# Patient Record
Sex: MALE | Race: WHITE | NOT HISPANIC OR LATINO | Employment: FULL TIME | URBAN - METROPOLITAN AREA
[De-identification: names, ages, dates, MRNs, and addresses within clinical notes are randomized per-mention and may not be internally consistent; named-entity substitution may affect disease eponyms.]

---

## 2021-02-03 ENCOUNTER — HISTORICAL (OUTPATIENT)
Dept: ADMINISTRATIVE | Facility: HOSPITAL | Age: 51
End: 2021-02-03

## 2021-02-22 LAB
BUN SERPL-MCNC: 16.5 MG/DL (ref 8.4–25.7)
CALCIUM SERPL-MCNC: 10.1 MG/DL (ref 8.4–10.2)
CHLORIDE SERPL-SCNC: 104 MMOL/L (ref 98–107)
CO2 SERPL-SCNC: 28 MMOL/L (ref 22–29)
CREAT SERPL-MCNC: 1.64 MG/DL (ref 0.72–1.25)
CREAT/UREA NIT SERPL: 10
GLUCOSE SERPL-MCNC: 112 MG/DL (ref 74–100)
POTASSIUM SERPL-SCNC: 5 MMOL/L (ref 3.5–5.1)
SARS-COV-2 AG RESP QL IA.RAPID: NEGATIVE
SODIUM SERPL-SCNC: 139 MMOL/L (ref 136–145)

## 2021-02-25 ENCOUNTER — HISTORICAL (OUTPATIENT)
Dept: SURGERY | Facility: HOSPITAL | Age: 51
End: 2021-02-25

## 2021-05-11 ENCOUNTER — HISTORICAL (OUTPATIENT)
Dept: ADMINISTRATIVE | Facility: HOSPITAL | Age: 51
End: 2021-05-11

## 2021-05-14 LAB — SARS-COV-2 AG RESP QL IA.RAPID: NEGATIVE

## 2021-05-17 ENCOUNTER — HISTORICAL (OUTPATIENT)
Dept: SURGERY | Facility: HOSPITAL | Age: 51
End: 2021-05-17

## 2021-10-21 ENCOUNTER — HISTORICAL (OUTPATIENT)
Dept: ADMINISTRATIVE | Facility: HOSPITAL | Age: 51
End: 2021-10-21

## 2021-10-21 LAB
ABS NEUT (OLG): 5.81 X10(3)/MCL (ref 2.1–9.2)
ALBUMIN SERPL-MCNC: 4.3 GM/DL (ref 3.5–5)
ALBUMIN/GLOB SERPL: 1.3 RATIO (ref 1.1–2)
ALP SERPL-CCNC: 73 UNIT/L (ref 40–150)
ALT SERPL-CCNC: 43 UNIT/L (ref 0–55)
APPEARANCE, UA: CLEAR
AST SERPL-CCNC: 24 UNIT/L (ref 5–34)
BASOPHILS # BLD AUTO: 0.03 X10(3)/MCL (ref 0–0.2)
BASOPHILS NFR BLD AUTO: 0.3 % (ref 0–0.9)
BILIRUB SERPL-MCNC: 0.5 MG/DL (ref 0.2–1.2)
BILIRUB UR QL STRIP: NEGATIVE
BILIRUBIN DIRECT+TOT PNL SERPL-MCNC: 0.2 MG/DL (ref 0–0.5)
BILIRUBIN DIRECT+TOT PNL SERPL-MCNC: 0.3 MG/DL (ref 0–0.8)
BUN SERPL-MCNC: 17.3 MG/DL (ref 8.4–25.7)
CALCIUM SERPL-MCNC: 10.5 MG/DL (ref 8.4–10.2)
CHLORIDE SERPL-SCNC: 105 MMOL/L (ref 98–107)
CO2 SERPL-SCNC: 29 MMOL/L (ref 22–29)
COLOR UR: NORMAL
CREAT SERPL-MCNC: 1.55 MG/DL (ref 0.72–1.25)
EOSINOPHIL # BLD AUTO: 0.14 X10(3)/MCL (ref 0–0.9)
EOSINOPHIL NFR BLD AUTO: 1.6 % (ref 0–6.5)
ERYTHROCYTE [DISTWIDTH] IN BLOOD BY AUTOMATED COUNT: 12.3 % (ref 11.5–17)
EST. AVERAGE GLUCOSE BLD GHB EST-MCNC: 102.5 MG/DL
GLOBULIN SER-MCNC: 3.4 GM/DL (ref 2.4–3.5)
GLUCOSE (UA): NEGATIVE
GLUCOSE SERPL-MCNC: 100 MG/DL (ref 74–100)
HBA1C MFR BLD: 5.2 %
HCT VFR BLD AUTO: 45.4 % (ref 42–52)
HGB BLD-MCNC: 14.6 GM/DL (ref 14–18)
HGB UR QL STRIP: NEGATIVE
IMM GRANULOCYTES # BLD AUTO: 0.04 10*3/UL (ref 0–0.02)
IMM GRANULOCYTES NFR BLD AUTO: 0.4 % (ref 0–0.43)
KETONES UR QL STRIP: NEGATIVE
LEUKOCYTE ESTERASE UR QL STRIP: NEGATIVE
LYMPHOCYTES # BLD AUTO: 2.17 X10(3)/MCL (ref 0.6–4.6)
LYMPHOCYTES NFR BLD AUTO: 24.4 % (ref 16.2–38.3)
MCH RBC QN AUTO: 28.6 PG (ref 27–31)
MCHC RBC AUTO-ENTMCNC: 32.2 GM/DL (ref 33–36)
MCV RBC AUTO: 89 FL (ref 80–94)
MONOCYTES # BLD AUTO: 0.7 X10(3)/MCL (ref 0.1–1.3)
MONOCYTES NFR BLD AUTO: 7.9 % (ref 4.7–11.3)
MRSA SCREEN BY PCR: NEGATIVE
NEUTROPHILS # BLD AUTO: 5.81 X10(3)/MCL (ref 2.1–9.2)
NEUTROPHILS NFR BLD AUTO: 65.4 % (ref 49.1–73.4)
NITRITE UR QL STRIP: NEGATIVE
NRBC BLD AUTO-RTO: 0 % (ref 0–0.2)
PH UR STRIP: 6 [PH] (ref 5–7)
PLATELET # BLD AUTO: 225 X10(3)/MCL (ref 130–400)
PMV BLD AUTO: 10.1 FL (ref 7.4–10.4)
POTASSIUM SERPL-SCNC: 4.6 MMOL/L (ref 3.5–5.1)
PROT SERPL-MCNC: 7.7 GM/DL (ref 6.4–8.3)
PROT UR QL STRIP: NEGATIVE
RBC # BLD AUTO: 5.1 X10(6)/MCL (ref 4.7–6.1)
SODIUM SERPL-SCNC: 141 MMOL/L (ref 136–145)
SP GR UR STRIP: 1.01 (ref 1–1.03)
UROBILINOGEN UR STRIP-ACNC: NEGATIVE
WBC # SPEC AUTO: 8.9 X10(3)/MCL (ref 4.5–11.5)

## 2021-11-05 ENCOUNTER — HISTORICAL (OUTPATIENT)
Dept: LAB | Facility: HOSPITAL | Age: 51
End: 2021-11-05

## 2021-11-05 LAB — SARS-COV-2 AG RESP QL IA.RAPID: NEGATIVE

## 2021-11-23 ENCOUNTER — HISTORICAL (OUTPATIENT)
Dept: ADMINISTRATIVE | Facility: HOSPITAL | Age: 51
End: 2021-11-23

## 2021-12-21 ENCOUNTER — HISTORICAL (OUTPATIENT)
Dept: ADMINISTRATIVE | Facility: HOSPITAL | Age: 51
End: 2021-12-21

## 2022-04-12 ENCOUNTER — HISTORICAL (OUTPATIENT)
Dept: ADMINISTRATIVE | Facility: HOSPITAL | Age: 52
End: 2022-04-12

## 2022-04-30 VITALS
DIASTOLIC BLOOD PRESSURE: 83 MMHG | SYSTOLIC BLOOD PRESSURE: 138 MMHG | HEIGHT: 76 IN | WEIGHT: 306.63 LBS | BODY MASS INDEX: 37.34 KG/M2

## 2022-04-30 NOTE — OP NOTE
Patient:   Lejeune, Scott             MRN: [id number]            FIN: [id number]               Age:   50 years     Sex:  Male     :  1970   Associated Diagnoses:   None   Author:   Nigel Elizondo Jr, MD      SURGEON: Nigel Elizondo MD   ASSISTANT: ALFA Hansen.  Ms. Earl was essential manipulating the leg while performed arthroscopy, and closure    PREOPERATIVE DIAGNOSIS:   Right knee lateral meniscus tear, trochlea chondromalacia    POSTOPERATIVE DIAGNOSIS:   Right knee lateral meniscus tear, trochlea chondromalacia,     PROCEDURE PERFORMED:  1.  Right knee arthroscopic partial lateral meniscectomy  2.  Right knee arthroscopic shaving chondroplasty of trochlea      ESTIMATED BLOOD LOSS: 10cc.    COMPLICATIONS: None.    TOURNIQUET TIME: About twenty minutes.    ANTIBIOTICS: Ancef     INDICATIONS FOR PROCEDURE: Gordy is a [age] y.o. male who has had ongoing right knee pain. The patient has had to limit activity due to intermittent pain & occasional mechanical symptoms. An MRI was performed that showed a meniscus tear that I felt would be amenable to arthroscopic debridement. The patient decided to opt for surgery after failing conservative management.    OPERATIVE REPORT: Gordy was initially seen in the preoperative holding area where history and physical were reviewed without change. The operative leg was marked, consents were reviewed, and any questions were answered for the patient and family. The patient was then taken back to the operating room, placed supine on the operating table with all bony prominences padded. Then a nonsterile tourniquet was placed around the upper thigh. The patient was induced under general anesthesia. The operative lower extremity was prepped and draped in standard sterile fashion. A timeout led by the surgeon was performed, and preoperative antibiotics were given. The limp was exsanguinated with gravity. The tourniquet was raised to 100 mmHg over the systolic blood  pressure.    The knee was then flexed and the inferolateral portal was created with an #11 blade scalpel.    The camera was introduced, using the blunt trocar, into the suprapatellar pouch. The undersurface of the patella was found to have scattered grade 2 chondral wear and the trochlear groove was found to have scattered grade 3 with an unstable flap chondral wear . The camera was then taken down into the lateral gutter, where no loose bodies and no plica were found. The camera was then brought into the medial gutter, where no loose bodies and no plica were seen. The camera was then brought into the medial compartment.    An inferomedial portal was then localized with a spinal needle, and created using an #11 blade. The medial meniscus was probed and found to have a no tears.  The medial femoral condyle was found to have focal grade 2/3. The medial tibial plateau demonstrated no wear.    The camera was then turned to the notch, where the ACL was found to be intact.    Then the leg was brought into figure-of-four position. The camera was brought into the lateral compartment. The lateral meniscus was probed and found to have complex tear involving the posterior horn. A combination of baskets and zachery were used to debride the meniscal flap down to a stable margin of approximately eighty percent remaining and then shaver was used to smooth the edges. The lateral femoral condyle was found to have no chondral wear. The lateral tibial plateau had no chondral wear.    The camera was brought up into the patellofemoral joint once more, with the knee in extension and shaving chondroplasty was performed on the trochlea until smooth and stable cartilage margins were present.    The knee was once more examined for loose bodies, of which none were found. The knee was then evacuated of all fluids. The portals were closed with 3-0 monocyrl interrupted sutures and steristrips. 10mL of 0.25% Bupivicaine were infiltrated around  each portal. A sterile dressing was placed, and the tourniquet was deflated after about twenty minutes. The patient was awakened from anesthesia and taken to the postoperative care unit in stable condition.

## 2022-04-30 NOTE — OP NOTE
Patient:   Lejeune, Scott             MRN: [id number]            FIN: [id number]               Age:   51 years     Sex:  Male     :  1970   Associated Diagnoses:   Primary osteoarthritis of left hip   Author:   Juve Cobian MD      Operative Note   Operative Information   Procedures Performed: Left hip injection using fluoroscopy.     Preoperative Diagnosis: Primary osteoarthritis of left hip (ZPD72-EP M16.12).     Postoperative Diagnosis: Primary osteoarthritis of left hip (JDX75-SP M16.12).     Surgeon: Juve Cobian MD.     Anesthesia: concious sedation.     Description of Procedure/Findings/    Complications: Patient was involved who complains of ongoing left hip pain.  We discussed all treatment options.  Patient has a history of arthritis to the left hip.  Patient has tried and failed all measures.  The risk, benefits, alternatives to injection of left hip under anesthesia were discussed in detail including but limited to infection, bleeding, scarring, need for revision surgery, and resolution of pain.  Despite these risks patient elected to proceed with surgical mention.    Patient seen in preoperative holding.  The risk benefits alternatives again discussed.  All questions answered no guarantees made.  Patient was marked and consented.  Was taken the operating room.  placed under conscious sedation.  Using fluoroscopic guidance, the insertion site was localized.  It was injected with 3-4 cc 1% lidocaine.  Afterwards an 18-gauge spinal needle was then inserted using fluoroscopic guidance to the superior anterior aspect of the left hip/acetabulum.  2-3 cc of Isovue contrast was then injected.  We had an excellent arthrogram.  Afterwards 2 cc 1% lidocaine and 12 mg of betamethasone was injected into the right hip joint.  Needle was removed.  Band-Aid was placed over the injection site.  Patient arose from anesthesia without issue.  Was transferred to recovery room in stable condition. postop  he will be weightbearing as tolerated.  Patient will be discharged home.  .     Esimated blood loss: No blood loss.

## 2022-05-05 NOTE — HISTORICAL [facility] C[facility]ER
This is a historical note converted from Ana. Formatting and pictures may have been removed.  Please reference Ana for original formatting and attached multimedia. Chief Complaint  left total hip, global 11/8/2021-2/6/22. f/u patient stated his pain is better  History of Present Illness  51-year-old male presents here to the clinic today 2-week status post left total hip arthroplasty. ?Overall the patient is doing?excellent.? He is ambulating without any assistance today here in clinic. ?He does state that he has some pain especially after physical therapy. ?He is currently taking Percocet 5 mg and?muscle relaxers at night.? These have helped to alleviate his pain and discomfort.? He does need refills of both today here in clinic.? He has no complaints of his incision site?in regards to bleeding and or?drainage.? He has removed the?tape around the incision site and due to skin tearing.? He currently is in physical therapy 3 times a week and which is helping his mobility, strengthening, and stability.  Review of Systems  Denies fevers, chills, chest pain, shortness of breath. Comprehensive review of systems performed and otherwise negative except as noted in HPI  Physical Exam  Vitals & Measurements  T:?98.7? ?F (Oral)? HR:?77(Peripheral)? BP:?116/66?  HT:?192.00?cm? WT:?139.300?kg? BMI:?37.79?  General: awake and alert, no acute distress, healthy appearing  ?Head and Neck: Head atraumatic/normocephalic. Moist MM  ?CV: brisk cap refill  ?Lungs: non-labored breathing, w/o cough or SOB  ?Skin: no rashes present, warm to touch  ?Neuro: sensation grossly intact distally  ?  Left hip exam:  Left hip incision clean dry and intact. No erythema, drainage, or signs of infection  ?No swelling distally. No signs of DVT  Brisk cap refill right ?foot  ?Sensation intact distally to right foot  ?Positive FHL/EHL/gastrocsoleus/tib ant  Assessment/Plan  1.?Aftercare following joint replacement surgery?Z47.1  ?Patient presents  to clinic today 2 weeks status post left total hip arthroplasty. ?Overall the patient is doing extremely well. ?His hip is stable upon examination and x-rays today here in clinic. ?In regards to his incision site it is well-healed?and he is not to shower without vigorously scrubbing the incision site. ?We refilled his Percocet fives and Robaxin today here in clinic.? We did educate about coming down to [place] and will do this starting with his next?pharmacy request, due to the holiday?coming up.? He was educated to have some assistance around especially after therapy. ?A DME prescription was written?for a walking cane?to have at hand.? He does state understanding of this.? We will have the patient return to clinic within 1 month with x-rays?to reassess the status of his hip.  Ordered:  Clinic Follow up, *Est. 12/23/21 3:00:00 CST, Order for future visit, [facility]  Post-Op follow-up visit [id number] , Aftercare following joint replacement surgery, [facility], 11/23/21 9:33:00 CST  XR Hip Left 2 Views, Routine, *Est. 12/23/21 3:00:00 CST, None, Ambulatory, Rad Type, Order for future visit, Aftercare following joint replacement surgery, Not Scheduled, *Est. 12/23/21 3:00:00 CST  ?  Orders:  acetaminophen-oxyCODONE, 1 tab(s), Oral, q6hr, PRN PRN for pain, # 28 tab(s), 0 Refill(s), Pharmacy: [id number], 192, cm, Height/Length Dosing, 11/23/21 8:59:00 CST, 139.3, kg, Weight Dosing, 11/23/21 8:59:00 CST  methocarbamol, 1,500 mg = 2 tab(s), Oral, TID, X 7 day(s), # 42 tab(s), 0 Refill(s), Pharmacy: [id number], 192, cm, Height/Length Dosing, 11/23/21 8:59:00 CST, 139.3, kg, Weight Dosing, 11/23/21 8:59:00 CST  The above findings, diagnostics, and treatment plan were discussed with Dr. Juve Cobian who is in agreement with the plan of care.  Referrals  Ambulatory Referral, Specialty: DME, Refer To: Provider Not Specified, Aba Anderson  [facility], [address] [place] [address] [place] [address] Start: 11/23/21 9:33:00 CST, Aftercare following joint replacement surgery  Clinic Follow up, *Est. 12/23/21 3:00:00 CST, Order for future visit, Aftercare following joint replacement surgery, [facility]   Problem List/Past Medical History  Ongoing  High blood pressure  Left hip pain  Obesity  Osteoarthritis of left hip  S/P arthroscopy of knee  Vascular disease  Historical  No qualifying data  Procedure/Surgical History  SHELTON DURAN Total Hip Arthroplasty (Left) (11/08/2021)  Replacement of Left Hip Joint with Synthetic Substitute, Uncemented, Open Approach (11/08/2021)  Robotic Assisted Procedure of Lower Extremity, Open Approach (11/08/2021)  Arthrocentesis, aspiration and/or injection, major joint or bursa (eg, shoulder, hip, knee, subacromial bursa); without ultrasound guidance (05/17/2021)  Injection Hip (Left) (05/17/2021)  Introduction of Anti-inflammatory into Joints, Percutaneous Approach (05/17/2021)  Arthroscopy Knee W/Menisectomy (Right) (02/25/2021)  Arthroscopy, knee, surgical; abrasion arthroplasty (includes chondroplasty where necessary) or multiple drilling or microfracture (02/25/2021)  Arthroscopy, knee, surgical; with meniscectomy (medial OR lateral, including any meniscal shaving) including debridement/shaving of articular cartilage (chondroplasty), same or separate compartment(s), when performed (02/25/2021)  Excision of Right Knee Joint, Percutaneous Endoscopic Approach (02/25/2021)  Excision of Right Knee Joint, Percutaneous Endoscopic Approach (02/25/2021)  Rt hip stent (2016)  Kidney  Knee   Medications  acetaminophen-oxycodone 325 mg-5 mg oral tablet, 1 tab(s), Oral, q6hr  aspirin 81 mg oral Delayed Release (EC) tablet, 81 mg= 1 tab(s), Oral, BID  Bystolic 10 mg oral tablet, 10 mg= 1 tab(s), Oral, Daily  clopidogrel 75 mg oral tablet, 75 mg= 1 tab(s), Oral, Daily  febuxostat 80 mg oral tablet, 40 mg= 0.5 tab(s), Oral,  Daily  Glucosamine Chondroitin Advanced  lisinopril 40 mg oral tablet, 40 mg= 1 tab(s), Oral, Daily  NexIUM, 10, Oral, Daily  Percocet 5/325 oral tablet, 1 tab(s), Oral, q6hr, PRN  polyethylene glycol 3350 oral powder for reconstitution, 17 gm, Oral, Daily, PRN  Robaxin 750 mg oral tablet, 1500 mg= 2 tab(s), Oral, TID  Robaxin 750 mg oral tablet, 750 mg= 1 tab(s), Oral, q6hr, PRN  Tumeric/Curcumin, 500 mg, Oral, Daily  Allergies  codeine?(Vomiting, Nausea)  Social History  Abuse/Neglect  No, No, Yes, 11/23/2021  Alcohol  Current, 1-2 times per week, 05/11/2021  Employment/School  Employed, 11/01/2021  Home/Environment  Lives with Spouse., 11/01/2021  Nutrition/Health  Regular, 11/01/2021  Spiritual/Cultural  P[employer], 11/01/2021  Substance Use  Never, 05/11/2021  Tobacco  Never (less than 100 in lifetime), No, 11/23/2021  Family History  Family history is negative  Health Maintenance  Health Maintenance  ???Pending?(in the next year)  ??? ??Due?  ??? ? ? ?ADL Screening due??11/23/21??and every 1??year(s)  ??? ? ? ?Colorectal Screening due??11/23/21??Unknown Frequency  ??? ? ? ?Lipid Screening due??11/23/21??Unknown Frequency  ??? ? ? ?Tetanus Vaccine due??11/23/21??and every 10??year(s)  ??? ? ? ?Zoster Vaccine due??11/23/21??Unknown Frequency  ??? ??Due In Future?  ??? ? ? ?Obesity Screening not due until??01/01/22??and every 1??year(s)  ??? ? ? ?Alcohol Misuse Screening not due until??01/02/22??and every 1??year(s)  ??? ? ? ?Depression Screening not due until??06/29/22??and every 1??year(s)  ??? ? ? ?Hypertension Management-BMP not due until??11/10/22??and every 1??year(s)  ??? ? ? ?Aspirin Therapy for CVD Prevention not due until??11/10/22??and every 1??year(s)  ???Satisfied?(in the past 1 year)  ??? ??Satisfied?  ??? ? ? ?Alcohol Misuse Screening on??03/29/21.??Satisfied by Yasir Nelson  ??? ? ? ?Aspirin Therapy for CVD Prevention on??11/10/21.??Satisfied by Celina Tovar RN  ??? ? ? ?Blood Pressure Screening  on??11/23/21.??Satisfied by Gisele Chew  ??? ? ? ?Body Mass Index Check on??11/23/21.??Satisfied by Gisele Chew  ??? ? ? ?Depression Screening on??06/29/21.??Satisfied by Bravo Miranda  ??? ? ? ?Diabetes Screening on??11/10/21.??Satisfied by Cortney Hernandez  ??? ? ? ?Hypertension Management-Blood Pressure on??11/23/21.??Satisfied by Gisele Chew  ??? ? ? ?Hypertension Management-BMP on??11/10/21.??Satisfied by Cortney Hernandez  ??? ? ? ?Influenza Vaccine on??11/08/21.??Satisfied by Linda FLORES, Kerline SLATER  ??? ? ? ?Obesity Screening on??11/23/21.??Satisfied by Gisele Chew  ?  Diagnostic Results  AP &?lateral?left hip?reviewed. ?Patients implants well fixed with no signs of loosening or subsidence noted.

## 2022-05-05 NOTE — HISTORICAL [facility] CERNER
This is a historical note converted from Ana. Formatting and pictures may have been removed.  Please reference Ana for original formatting and attached multimedia. Chief Complaint  Patient presents for left hip arthritis. Referred by Dr Elizondo. States pain has been progressing for about a year. Having sharp pain when he walks. Denies having had any injections to help with pain in the past. Taking ibuprofen/aleve to help with pain.  History of Present Illness  51-year-old male presents to the clinic today?for left?hip pain.? He is a patient of Dr. Elizondo and was referred?to us. ?Left hip pain is more into the groin and radiates into the buttock area.? His pain began about a year ago and has progressively gotten worse.??The pain is worse with?walking, putting onto his socks,?and going up and down the stairs. ?He has tried exercises,?chiropractor,?and over-the-counter anti-inflammatories.? These nonsurgical modalities have?helped with the pain very minimally.? He would like to discuss options?today to help with the pain.  Review of Systems  Denies fevers, chills, chest pain, shortness of breath. Comprehensive review of systems performed and otherwise negative except as noted in HPI  Physical Exam  Vitals & Measurements  T:?97.6? ?F (Oral)? HR:?63(Peripheral)? BP:?143/81?  HT:?192.00?cm? WT:?137.890?kg? BMI:?37.41?  General: awake and alert, no acute distress, healthy appearing  ?Head and Neck: Head atraumatic/normocephalic. Moist MM  ?CV: brisk cap refill  ?Lungs: non-labored breathing, w/o cough or SOB  ?Skin: no rashes present, warm to touch  ?Neuro: sensation grossly intact distally  ?  Left hip:  Skin warm, dry, and intact  Brisk capillary refill distally  Pulse +2  Sensation intact distally  Range of motion with internal and external rotation?with groin pain  Negative straight leg raise  Positive [facility]  Assessment/Plan  1.?Osteoarthritis of left hip?M16.12  ?Patient does have a history of osteoarthritis  to the left hip.? He has tried exercise,?chiropractor,?and over-the-counter anti-inflammatories which have?minimally helped with the pain.? We have discussed options?such as an injection and?total hip arthroplasty.? At this time he would like to undergo a steroid?hip injection?under?fluoroscopy.? He understands the risk and benefits of the procedure and would like to go forward the procedure.? We will pick a date for his procedure?today here in clinic.? After the injection he would like to proceed with a total hip arthroplasty?during the fall.   Problem List/Past Medical History  Ongoing  High blood pressure  Obesity  S/P arthroscopy of knee  Vascular disease  Historical  No qualifying data  Procedure/Surgical History  Arthroscopy Knee W/Menisectomy (Right) (02/25/2021)  Arthroscopy, knee, surgical; abrasion arthroplasty (includes chondroplasty where necessary) or multiple drilling or microfracture (02/25/2021)  Arthroscopy, knee, surgical; with meniscectomy (medial OR lateral, including any meniscal shaving) including debridement/shaving of articular cartilage (chondroplasty), same or separate compartment(s), when performed (02/25/2021)  Excision of Right Knee Joint, Percutaneous Endoscopic Approach (02/25/2021)  Excision of Right Knee Joint, Percutaneous Endoscopic Approach (02/25/2021)  Kidney  Knee  Stent   Medications  azithromycin 250 mg oral tablet, Oral, As Directed,? ?Not Taking, Completed Rx  Bystolic 10 mg oral tablet, 10 mg= 1 tab(s), Oral, Daily  clopidogrel 75 mg oral tablet, 75 mg= 1 tab(s), Oral, Daily  febuxostat 80 mg oral tablet, 80 mg= 1 tab(s), Oral, Daily  Glucosamine Chondroitin Advanced  lisinopril 40 mg oral tablet, 40 mg= 1 tab(s), Oral, Daily  Tumeric/Curcumin, 500 mg, Oral, Daily  Allergies  codeine?(Vomiting, Nausea)  Social History  Abuse/Neglect  No, No, Yes, 05/11/2021  Alcohol  Current, 1-2 times per week, 05/11/2021  Substance Use  Never, 05/11/2021  Tobacco  Never (less than 100 in  lifetime), No, 05/11/2021  Family History  Family history is negative  Health Maintenance  Health Maintenance  ???Pending?(in the next year)  ??? ??OverDue  ??? ? ? ?Influenza Vaccine due??10/01/20??and every 1??day(s)  ??? ??Due?  ??? ? ? ?ADL Screening due??05/11/21??and every 1??year(s)  ??? ? ? ?Aspirin Therapy for CVD Prevention due??05/11/21??and every 1??year(s)  ??? ? ? ?Colorectal Screening due??05/11/21??Unknown Frequency  ??? ? ? ?Lipid Screening due??05/11/21??Unknown Frequency  ??? ? ? ?Tetanus Vaccine due??05/11/21??and every 10??year(s)  ??? ? ? ?Zoster Vaccine due??05/11/21??Unknown Frequency  ??? ??Due In Future?  ??? ? ? ?Obesity Screening not due until??01/01/22??and every 1??year(s)  ??? ? ? ?Alcohol Misuse Screening not due until??01/02/22??and every 1??year(s)  ??? ? ? ?Hypertension Management-BMP not due until??02/22/22??and every 1??year(s)  ???Satisfied?(in the past 1 year)  ??? ??Satisfied?  ??? ? ? ?Alcohol Misuse Screening on??03/29/21.??Satisfied by Yasir Nelson  ??? ? ? ?Blood Pressure Screening on??05/11/21.??Satisfied by Aylin Santiago  ??? ? ? ?Body Mass Index Check on??05/11/21.??Satisfied by Aylin Santiago  ??? ? ? ?Depression Screening on??05/11/21.??Satisfied by Aylin Santiago  ??? ? ? ?Diabetes Screening on??02/22/21.??Satisfied by Jack Boo  ??? ? ? ?Hypertension Management-Blood Pressure on??05/11/21.??Satisfied by Aylin Santiago  ??? ? ? ?Influenza Vaccine on??03/29/21.??Satisfied by Yasir Nelson  ??? ? ? ?Obesity Screening on??05/11/21.??Satisfied by Aylin Santiago  ?  Diagnostic Results  AP and lateral?left hip reviewed.? Joint space narrowing?and osteophytes noted?throughout?joint.

## 2022-05-05 NOTE — HISTORICAL [facility]
This is a historical note converted from Ana. Formatting and pictures may have been removed.  Please reference Ana for original formatting and attached multimedia. Chief Complaint  PT PRESENTS FOR PRE OP FOR LEFT DANILO ON 11-8-21  History of Present Illness  51-year-old male presents here follow-up of left osteoarthritis knee patient has had seen pain in left hip. ?He tried Ventolin to measure an anti-inflammatory take medications but he became. ?Patient feels a he has reached a point of disability guards left hip proceed with surgical intervention.  Review of Systems  Denies fevers, chills, chest pain, shortness of breath. Comprehensive review of systems performed and otherwise negative except as noted in HPI.  Physical Exam  Vitals & Measurements  T:?98.1? ?F (Oral)? BP:?144/81?  HT:?192.00?cm? WT:?139.250?kg? BMI:?37.77?  General: No acute distress, alert and oriented, healthy appearing?  HEENT: Head is atraumatic, mucous membranes are moist?  Cardiovascular: Brisk capillary refill  Lungs: Breathing non-labored?  Skin: no rashes appreciated?  Neurologic: Sensation is grossly intact distally  ?  Left hip:  Patient walks antalgic gait.? He has pain with internal and external rotation. ?Positive Stinchfield. ?Brisk cap refill distally.  Assessment/Plan  1.?Osteoarthritis of left hip?M16.12  At this point the patient is tried and failed all conservative management with regards to their left hip. ?They have tried and failed nonoperative management including: Anti-inflammatories, activity modification, and assistive devices. All of these have failed to completely remove their pain. They have pain putting on shoes and socks, getting in and out of a car, as well as walking on level ground. Their hip pain is affecting activities of daily living They feel that theyve reached a point of disability with regards to their hip. X-rays reveal advanced, end-stage degenerative osteoarthritis as noted by subchondral sclerosis,  joint space narrowing, and periarticular osteophytes. The patient would like to proceed with surgical intervention and would be a good candidate for total hip replacement.  Total hip arthroplasty procedure, alternatives, risks, and benefits were discussed in detail. The risks including but not limited to: infection, need for revision surgery, pain, swelling, loosening, injury to surrounding neurovascular structures, stiffness, incomplete resolution of pain, limb length discrepancy, DVT, PE, and death were discussed in detail. Despite these risks, the patient would like to proceed with surgical intervention. All questions were answered, no guarantees made. Will plan for left DANILO on 11/8/2021.  Patient is currently on blood thinners. ?We will be monitored closely for postoperative bleeding. ?Plan to be in the hospital 48 to 72 hours.  Ordered:  XR Spine Lumbar 2 or 3 Views, Routine, 10/21/21 13:59:00 CDT, None, Ambulatory, Rad Type, Osteoarthritis of left hip, Not Scheduled, 10/21/21 13:59:00 CDT  ?   Problem List/Past Medical History  Ongoing  High blood pressure  Left hip pain  Obesity  Osteoarthritis of left hip  S/P arthroscopy of knee  Vascular disease  Historical  No qualifying data  Procedure/Surgical History  Arthrocentesis, aspiration and/or injection, major joint or bursa (eg, shoulder, hip, knee, subacromial bursa); without ultrasound guidance (05/17/2021)  Injection Hip (Left) (05/17/2021)  Introduction of Anti-inflammatory into Joints, Percutaneous Approach (05/17/2021)  Arthroscopy Knee W/Menisectomy (Right) (02/25/2021)  Arthroscopy, knee, surgical; abrasion arthroplasty (includes chondroplasty where necessary) or multiple drilling or microfracture (02/25/2021)  Arthroscopy, knee, surgical; with meniscectomy (medial OR lateral, including any meniscal shaving) including debridement/shaving of articular cartilage (chondroplasty), same or separate compartment(s), when performed (02/25/2021)  Excision of  Right Knee Joint, Percutaneous Endoscopic Approach (02/25/2021)  Excision of Right Knee Joint, Percutaneous Endoscopic Approach (02/25/2021)  Rt hip stent (2016)  Kidney  Knee   Medications  Bystolic 10 mg oral tablet, 10 mg= 1 tab(s), Oral, Daily  clopidogrel 75 mg oral tablet, 75 mg= 1 tab(s), Oral, Daily  febuxostat 80 mg oral tablet, 80 mg= 1 tab(s), Oral, Daily  Glucosamine Chondroitin Advanced  lisinopril 40 mg oral tablet, 40 mg= 1 tab(s), Oral, Daily  Tumeric/Curcumin, 500 mg, Oral, Daily  Allergies  codeine?(Vomiting, Nausea)  Social History  Abuse/Neglect  No, No, Yes, 10/07/2021  Alcohol  Current, 1-2 times per week, 05/11/2021  Substance Use  Never, 05/11/2021  Tobacco  Never (less than 100 in lifetime), No, 10/07/2021  Family History  Family history is negative  Health Maintenance  Health Maintenance  ???Pending?(in the next year)  ??? ??Due?  ??? ? ? ?ADL Screening due??10/21/21??and every 1??year(s)  ??? ? ? ?Aspirin Therapy for CVD Prevention due??10/21/21??and every 1??year(s)  ??? ? ? ?Colorectal Screening due??10/21/21??Unknown Frequency  ??? ? ? ?Lipid Screening due??10/21/21??Unknown Frequency  ??? ? ? ?Tetanus Vaccine due??10/21/21??and every 10??year(s)  ??? ? ? ?Zoster Vaccine due??10/21/21??Unknown Frequency  ??? ??Due In Future?  ??? ? ? ?Obesity Screening not due until??01/01/22??and every 1??year(s)  ??? ? ? ?Alcohol Misuse Screening not due until??01/02/22??and every 1??year(s)  ??? ? ? ?Hypertension Management-BMP not due until??02/22/22??and every 1??year(s)  ??? ? ? ?Depression Screening not due until??06/29/22??and every 1??year(s)  ???Satisfied?(in the past 1 year)  ??? ??Satisfied?  ??? ? ? ?Alcohol Misuse Screening on??03/29/21.??Satisfied by Yasir Nelson  ??? ? ? ?Blood Pressure Screening on??10/21/21.??Satisfied by GODWIN Turk Tinea  ??? ? ? ?Body Mass Index Check on??10/21/21.??Satisfied by GODWIN Turk Tinea  ??? ? ? ?Depression Screening on??06/29/21.??Satisfied by  Bravo Miranda  ??? ? ? ?Diabetes Screening on??02/22/21.??Satisfied by Jack Boo  ??? ? ? ?Hypertension Management-Blood Pressure on??10/21/21.??Satisfied by GODWIN Turk Tinea  ??? ? ? ?Influenza Vaccine on??10/21/21.??Satisfied by GODWIN Turk Tinea  ??? ? ? ?Obesity Screening on??10/21/21.??Satisfied by GODWIN Turk Tinea  ?  Diagnostic Results  Lateral sitting and standing lumbar spine x-rays reviewed for preoperative evaluation only.

## 2022-05-05 NOTE — HISTORICAL [facility]
This is a historical note converted from Ana. Formatting and pictures may have been removed.  Please reference Ana for original formatting and attached multimedia. Chief Complaint  New patient here with right knee pain after climbing on a bush hog and twisted knee. [place] pop and had immediate pain and some swelling. Xrays today.  History of Present Illness  He is a pleasant 49yo who presents with right knee pain since January 2021 after climbing a bush hog and twisting knee. Injury was 1 week ago. He felt a pop and had swelling. He has since had continued intermittent swelling. Pain is described as sharp, shooting. It is located in the anterior knee and radiates to the posterior. It is worse with bending, walking down stairs, and getting up. It is better with rest. He has tried Ibuprofen with some relief.  Review of Systems  Comprehensive review of system was performed with no exceptions other than noted in the history of present illness  Physical Exam  Vitals & Measurements  HR:?61(Peripheral)? BP:?143/75?  HT:?192.00?cm? WT:?136.500?kg? BMI:?37.03?  Gen: WN, WD, NAD  Card/Res: NL breathing, +distal pulses  Abdomen: ND  Standing exam  stance: normal alignment, no significant leg-length discrepancy  gait:?Antalgic limp  ?   Knee examination  - General comments: unremarkable appearance  ?   - Tenderness: MJL, Posterior knee  ?   Knee???????????????RIGHT??????????LEFT  Skin:??????????????? Intact ??????????Intact  ROM:??????????????? 0-120??????????0-130  Effusion:??????????? +???????????? Neg  MJL TTP:??????????? +??????????????? Neg  LJL TTP:????????????Neg?????????? ?? Neg  Aline:?????????? +???????????? Neg  Pat crep:??????????? Neg???????????? Neg  Patella TTPs:????? Neg?????????????Neg  Patella grind:????? Neg??????????? ?Neg  Lachman:???????????Neg???????????? Neg  Pivot shift: ?????? ??Neg??????????? Neg  Valgus stress:???? ?Neg??????? ???Neg  Varus stress:?????? Neg????????????Neg  Posterior drawer:  Neg????????? ??Neg  ?   N-V????????????????intact????????????? intact  Hip:?????????????????nml?????????????? nml  ?   Lower extremity edema:Negative??  Assessment/Plan  1.?Tear of medial meniscus of right knee?S83.241A  ?Concern for medial meniscus tear. ?Will get MRI to evaluate  Ordered:  Clinic Follow up, *Est. 02/08/21 8:30:00 CST, Order for future visit, Tear of medial meniscus of right knee, [facility]  MRI Ext Lower Joint Right W/O Contrast, Routine, 02/03/21 9:05:00 CST, Other (please specify), Knee trauma, internal derangement suspected, neg xray or avulsion fracture, None, Ambulatory, Rad Type, Order for future visit, Tear of medial meniscus of right knee, Schedule this test, Outside F...  Office/Outpatient Visit Level 3 New [id number] PC, [facility] medial meniscus of right knee[facility], 02/03/21 9:03:00 CST  ?  Orders:  XR Knee Right 4 or More Views, Routine, 02/03/21 8:33:00 CST, None, Ambulatory, Rad Type, Right knee pain, Not Scheduled, 02/03/21 8:33:00 CST   Problem List/Past Medical History  Ongoing  High blood pressure  Obesity  Vascular disease  Historical  No qualifying data  Procedure/Surgical History  Kidney  Knee  Stent   Medications  Bystolic 10 mg oral tablet, 10 mg= 1 tab(s), Oral, Daily  clopidogrel 75 mg oral tablet, 75 mg= 1 tab(s), Oral, Daily  febuxostat 80 mg oral tablet, 80 mg= 1 tab(s), Oral, Daily  Glucosamine Chondroitin Advanced  lisinopril 40 mg oral tablet, 40 mg= 1 tab(s), Oral, Daily  Allergies  codeine?(Vomiting, Nausea)  Social History  Abuse/Neglect  No, No, Yes, 02/03/2021  Alcohol  Current, 1-2 times per month, 02/03/2021  Tobacco  Never (less than 100 in lifetime), No, 02/03/2021  Health Maintenance  Health Maintenance  ???Pending?(in the next year)  ??? ??Due?  ??? ? ? ?Alcohol Misuse Screening due??01/02/21??and every 1??year(s)  ??? ? ? ?ADL Screening due??02/03/21??and every 1??year(s)  ??? ? ? ?Aspirin Therapy for CVD Prevention due??02/03/21??and every  1??year(s)  ??? ? ? ?Tetanus Vaccine due??02/03/21??and every 10??year(s)  ??? ??Due In Future?  ??? ? ? ?Obesity Screening not due until??01/01/22??and every 1??year(s)  ???Satisfied?(in the past 1 year)  ??? ??Satisfied?  ??? ? ? ?Blood Pressure Screening on??02/03/21.??Satisfied by Anuja Mathur.  ??? ? ? ?Body Mass Index Check on??02/03/21.??Satisfied by Anuja Mathur.  ??? ? ? ?Obesity Screening on??02/03/21.??Satisfied by Anuja Mathur  ?  Diagnostic Results  Knee radiographs showed no?arthritis

## 2022-05-21 NOTE — HISTORICAL [facility]
This is a historical note converted from Ana. Formatting and pictures may have been removed.  Please reference Ana for original formatting and attached multimedia. Chief Complaint  Follow up left total hip 11/8/21-2/6/22. States slight pain at times, reports sometimes more stiff than others. Overall doing well.  History of Present Illness  51-year-old male 6 weeks out from?left hip arthritis. ?Overall doing fairly well. ?Some stiffness but otherwise happy with his recovery and pain relief.  Review of Systems  Denies fevers, chills, chest pain, shortness of breath. Comprehensive review of systems performed and otherwise negative except as noted in HPI.  Physical Exam  Vitals & Measurements  T:?98.5? ?F (Oral)? HR:?59(Peripheral)? BP:?138/83?  HT:?192.00?cm? WT:?139.070?kg? BMI:?37.73?  Hip exam:  Left?hip incision clean dry and intact. No erythema, drainage, or signs of infection  No swelling distally. No signs of DVT  No pain with logroll  Sensation intact distally to right foot  Positive FHL/EHL/gastrocsoleus/tib ant brisk capillary refill right foot  ?  Assessment/Plan  1.?Aftercare following joint replacement surgery?Z47.1  ?Overall patient doing very well. ?He like to return to work. ?We will clear him for this. ?We will see him back in 2 months. ?No new x-rays needed at that time.  Referrals  Clinic Follow up, *Est. 02/21/22 3:00:00 CST, Order for future visit, Aftercare following joint replacement surgery, [facility]   Problem List/Past Medical History  Ongoing  High blood pressure  Left hip pain  Obesity  Osteoarthritis of left hip  S/P arthroscopy of knee  Vascular disease  Historical  No qualifying data  Procedure/Surgical History  SHELTON DURAN Total Hip Arthroplasty (Left) (11/08/2021)  Replacement of Left Hip Joint with Synthetic Substitute, Uncemented, Open Approach (11/08/2021)  Robotic Assisted Procedure of Lower Extremity, Open Approach (11/08/2021)  Arthrocentesis, aspiration and/or injection,  major joint or bursa (eg, shoulder, hip, knee, subacromial bursa); without ultrasound guidance (05/17/2021)  Injection Hip (Left) (05/17/2021)  Introduction of Anti-inflammatory into Joints, Percutaneous Approach (05/17/2021)  Arthroscopy Knee W/Menisectomy (Right) (02/25/2021)  Arthroscopy, knee, surgical; abrasion arthroplasty (includes chondroplasty where necessary) or multiple drilling or microfracture (02/25/2021)  Arthroscopy, knee, surgical; with meniscectomy (medial OR lateral, including any meniscal shaving) including debridement/shaving of articular cartilage (chondroplasty), same or separate compartment(s), when performed (02/25/2021)  Excision of Right Knee Joint, Percutaneous Endoscopic Approach (02/25/2021)  Excision of Right Knee Joint, Percutaneous Endoscopic Approach (02/25/2021)  Rt hip stent (2016)  Kidney  Knee   Medications  acetaminophen-oxycodone 325 mg-5 mg oral tablet, 1 tab(s), Oral, q6hr,? ?Not taking  aspirin 81 mg oral Delayed Release (EC) tablet, 81 mg= 1 tab(s), Oral, BID  Bystolic 10 mg oral tablet, 10 mg= 1 tab(s), Oral, Daily  clopidogrel 75 mg oral tablet, 75 mg= 1 tab(s), Oral, Daily  febuxostat 80 mg oral tablet, 40 mg= 0.5 tab(s), Oral, Daily  Glucosamine Chondroitin Advanced  lisinopril 40 mg oral tablet, 40 mg= 1 tab(s), Oral, Daily  NexIUM, 10, Oral, Daily  Percocet 5/325 oral tablet, 1 tab(s), Oral, q6hr, PRN,? ?Not taking  Tumeric/Curcumin, 500 mg, Oral, Daily  Zithromax TRI-BRIDGETTE 500 mg oral tablet, 500 mg= 1 tab(s), Oral, Daily  Allergies  codeine?(Vomiting, Nausea)  Social History  Abuse/Neglect  No, No, Yes, 12/21/2021  Alcohol  Current, 1-2 times per week, 12/21/2021  Employment/School  Employed, 11/01/2021  Home/Environment  Lives with Spouse., 11/01/2021  Nutrition/Health  Regular, 11/01/2021  Spiritual/Cultural  Restorationist, 11/01/2021  Substance Use  Never, 05/11/2021  Tobacco  Never (less than 100 in lifetime), No, 12/21/2021  Family History  Family history is  negative  Health Maintenance  Health Maintenance  ???Pending?(in the next year)  ??? ??OverDue  ??? ? ? ?Influenza Vaccine due??10/01/21??and every 1??day(s)  ??? ??Due?  ??? ? ? ?Obesity Screening due??01/01/22??and every 1??year(s)  ??? ? ? ?Alcohol Misuse Screening due??01/02/22??and every 1??year(s)  ??? ? ? ?ADL Screening due??02/02/22??and every 1??year(s)  ??? ? ? ?Colorectal Screening due??02/02/22??Unknown Frequency  ??? ? ? ?Lipid Screening due??02/02/22??Unknown Frequency  ??? ? ? ?Tetanus Vaccine due??02/02/22??and every 10??year(s)  ??? ? ? ?Zoster Vaccine due??02/02/22??Unknown Frequency  ??? ??Due In Future?  ??? ? ? ?Hypertension Management-BMP not due until??11/10/22??and every 1??year(s)  ??? ? ? ?Aspirin Therapy for CVD Prevention not due until??11/10/22??and every 1??year(s)  ??? ? ? ?Blood Pressure Screening not due until??12/21/22??and every 1??year(s)  ??? ? ? ?Body Mass Index Check not due until??12/21/22??and every 1??year(s)  ??? ? ? ?Depression Screening not due until??12/21/22??and every 1??year(s)  ??? ? ? ?Hypertension Management-Blood Pressure not due until??12/21/22??and every 1??year(s)  ???Satisfied?(in the past 1 year)  ??? ??Satisfied?  ??? ? ? ?Alcohol Misuse Screening on??03/29/21.??Satisfied by Yasir Nelson  ??? ? ? ?Aspirin Therapy for CVD Prevention on??11/10/21.??Satisfied by Celina Tovar RN  ??? ? ? ?Blood Pressure Screening on??12/21/21.??Satisfied by Aylin Santiago  ??? ? ? ?Body Mass Index Check on??12/21/21.??Satisfied by Aylin Santiago  ??? ? ? ?Depression Screening on??12/21/21.??Satisfied by Aylin Santiago  ??? ? ? ?Diabetes Screening on??11/10/21.??Satisfied by Cortney Hernandez  ??? ? ? ?Hypertension Management-Blood Pressure on??12/21/21.??Satisfied by Aylin Santiago  ??? ? ? ?Hypertension Management-BMP on??11/10/21.??Satisfied by Cortney Hernandez  ??? ? ? ?Influenza Vaccine on??11/08/21.??Satisfied by Linda FLORES, Kerline LANCE.  ??? ? ? ?Obesity  Screening on??12/21/21.??Satisfied by Aylin Santiago  ?  Diagnostic Results  AP lateral left hip reviewed. ?Patients implants appear well fixed. ?No signs of loosening or subsidence noted.

## 2022-11-29 ENCOUNTER — HOSPITAL ENCOUNTER (OUTPATIENT)
Dept: RADIOLOGY | Facility: CLINIC | Age: 52
Discharge: HOME | End: 2022-11-29
Attending: ORTHOPAEDIC SURGERY
Payer: COMMERCIAL

## 2022-11-29 ENCOUNTER — OFFICE VISIT (OUTPATIENT)
Dept: ORTHOPEDICS | Facility: CLINIC | Age: 52
End: 2022-11-29
Payer: COMMERCIAL

## 2022-11-29 VITALS
SYSTOLIC BLOOD PRESSURE: 144 MMHG | HEIGHT: 75 IN | DIASTOLIC BLOOD PRESSURE: 88 MMHG | BODY MASS INDEX: 38.05 KG/M2 | HEART RATE: 60 BPM | WEIGHT: 306 LBS

## 2022-11-29 DIAGNOSIS — Z96.642 HISTORY OF LEFT HIP REPLACEMENT: Primary | ICD-10-CM

## 2022-11-29 DIAGNOSIS — Z96.642 HISTORY OF LEFT HIP REPLACEMENT: ICD-10-CM

## 2022-11-29 PROCEDURE — 3077F SYST BP >= 140 MM HG: CPT | Mod: CPTII,,, | Performed by: ORTHOPAEDIC SURGERY

## 2022-11-29 PROCEDURE — 1159F MED LIST DOCD IN RCRD: CPT | Mod: CPTII,,, | Performed by: ORTHOPAEDIC SURGERY

## 2022-11-29 PROCEDURE — 1159F PR MEDICATION LIST DOCUMENTED IN MEDICAL RECORD: ICD-10-PCS | Mod: CPTII,,, | Performed by: ORTHOPAEDIC SURGERY

## 2022-11-29 PROCEDURE — 99213 PR OFFICE/OUTPT VISIT, EST, LEVL III, 20-29 MIN: ICD-10-PCS | Mod: ,,, | Performed by: ORTHOPAEDIC SURGERY

## 2022-11-29 PROCEDURE — 3008F PR BODY MASS INDEX (BMI) DOCUMENTED: ICD-10-PCS | Mod: CPTII,,, | Performed by: ORTHOPAEDIC SURGERY

## 2022-11-29 PROCEDURE — 73502 X-RAY EXAM HIP UNI 2-3 VIEWS: CPT | Mod: LT,,, | Performed by: ORTHOPAEDIC SURGERY

## 2022-11-29 PROCEDURE — 3079F DIAST BP 80-89 MM HG: CPT | Mod: CPTII,,, | Performed by: ORTHOPAEDIC SURGERY

## 2022-11-29 PROCEDURE — 4010F PR ACE/ARB THEARPY RXD/TAKEN: ICD-10-PCS | Mod: CPTII,,, | Performed by: ORTHOPAEDIC SURGERY

## 2022-11-29 PROCEDURE — 3077F PR MOST RECENT SYSTOLIC BLOOD PRESSURE >= 140 MM HG: ICD-10-PCS | Mod: CPTII,,, | Performed by: ORTHOPAEDIC SURGERY

## 2022-11-29 PROCEDURE — 99213 OFFICE O/P EST LOW 20 MIN: CPT | Mod: ,,, | Performed by: ORTHOPAEDIC SURGERY

## 2022-11-29 PROCEDURE — 3008F BODY MASS INDEX DOCD: CPT | Mod: CPTII,,, | Performed by: ORTHOPAEDIC SURGERY

## 2022-11-29 PROCEDURE — 3079F PR MOST RECENT DIASTOLIC BLOOD PRESSURE 80-89 MM HG: ICD-10-PCS | Mod: CPTII,,, | Performed by: ORTHOPAEDIC SURGERY

## 2022-11-29 PROCEDURE — 73502 XR HIP WITH PELVIS WHEN PERFORMED, 2 OR 3 VIEWS LEFT: ICD-10-PCS | Mod: LT,,, | Performed by: ORTHOPAEDIC SURGERY

## 2022-11-29 PROCEDURE — 4010F ACE/ARB THERAPY RXD/TAKEN: CPT | Mod: CPTII,,, | Performed by: ORTHOPAEDIC SURGERY

## 2022-11-29 RX ORDER — LISINOPRIL 40 MG/1
40 TABLET ORAL DAILY
COMMUNITY

## 2022-11-29 RX ORDER — CLOPIDOGREL BISULFATE 75 MG/1
75 TABLET ORAL DAILY
COMMUNITY

## 2022-11-29 RX ORDER — NEBIVOLOL 10 MG/1
10 TABLET ORAL DAILY
COMMUNITY

## 2022-11-29 NOTE — PROGRESS NOTES
Past Medical History:   Diagnosis Date    AVN (avascular necrosis of bone)     HTN (hypertension)        Past Surgical History:   Procedure Laterality Date    KIDNEY SURGERY      medial menisectomy Right 02/25/2021    right hip stent      TOTAL HIP ARTHROPLASTY Left 11/08/2021       Current Outpatient Medications   Medication Sig    clopidogreL (PLAVIX) 75 mg tablet Take 75 mg by mouth once daily.    lisinopriL (PRINIVIL,ZESTRIL) 40 MG tablet Take 40 mg by mouth once daily.    nebivoloL (BYSTOLIC) 10 MG Tab Take 10 mg by mouth once daily.     No current facility-administered medications for this visit.       Review of patient's allergies indicates:  No Known Allergies    History reviewed. No pertinent family history.    Social History     Socioeconomic History    Marital status: Unknown   Tobacco Use    Smoking status: Never    Smokeless tobacco: Never       Chief Complaint:   Chief Complaint   Patient presents with    Left Hip - Follow-up     Follow-up for left total hip sx on 11/8/21. Hip is doing good. Not having any pain. Mobility is good and not having any issues with hip.        History of present illness:  52-year-old male presents for follow-up of total hip arthroplasty.  Patient is year out.  Overall doing well.  Pain is controlled.  He is overall happy with his recovery and is pain-free today.      Review of Systems:    Constitution: Negative for chills, fever, and sweats.  Negative for unexplained weight loss.    HENT:  Negative for headaches and blurry vision.    Cardiovascular:Negative for chest pain or irregular heart beat. Negative for hypertension.    Respiratory:  Negative for cough and shortness of breath.    Gastrointestinal: Negative for abdominal pain, heartburn, melena, nausea, and vomitting.    Genitourinary:  Negative bladder incontinence and dysuria.    Musculoskeletal:  See HPI    Neurological: Negative for numbness.    Psychiatric/Behavioral: Negative for depression.  The patient is not  nervous/anxious.      Endocrine: Negative for polyuria    Hematologic/Lymphatic: Negative for bleeding problem.  Does not bruise/bleed easily.    Skin: Negative for poor would healing and rash      Physical Examination:    Vital Signs:    Vitals:    11/29/22 0945   BP: (!) 144/88   Pulse: 60       Body mass index is 38.25 kg/m².    General: No acute distress, alert and oriented, healthy appearing    HEENT: Head is atraumatic, mucous membranes are moist    Neck: Supples, no JVD    Cardiovascular: Palpable dorsalis pedis and posterior tibial pulses, regular rate and rhythm to those pulses    Lungs: Breathing non-labored    Skin: no rashes appreciated    Neurologic: Can flex and extend knees, ankles, and toes. Sensation is grossly intact    Left hip:  Range of motion left hip without significant pain or discomfort.  Patient walks without an antalgic gait    X-rays:  Three views of left hip reviewed.  Patient's implants well fixed.  No signs of loosening or subsidence noted.     Assessment::  Status post left total hip arthroplasty    Plan:  Overall patient doing well.  Pain is controlled.  This covered.  We will plan to see him back in 3-5 years given his young age    This note was created using ADOR voice recognition software that occasionally misinterpreted phrases or words.    Consult note is delivered via Epic messaging service.

## 2023-06-07 DIAGNOSIS — K21.9 GASTROESOPHAGEAL REFLUX DISEASE, UNSPECIFIED WHETHER ESOPHAGITIS PRESENT: Primary | ICD-10-CM

## 2023-06-07 RX ORDER — ESOMEPRAZOLE MAGNESIUM 40 MG/1
40 CAPSULE, DELAYED RELEASE ORAL
Qty: 30 CAPSULE | Refills: 11 | Status: SHIPPED | OUTPATIENT
Start: 2023-06-07 | End: 2024-06-06

## 2024-09-30 ENCOUNTER — HOSPITAL ENCOUNTER (OUTPATIENT)
Dept: RADIOLOGY | Facility: CLINIC | Age: 54
Discharge: HOME | End: 2024-09-30
Attending: ORTHOPAEDIC SURGERY
Payer: COMMERCIAL

## 2024-09-30 ENCOUNTER — OFFICE VISIT (OUTPATIENT)
Dept: ORTHOPEDICS | Facility: CLINIC | Age: 54
End: 2024-09-30
Payer: COMMERCIAL

## 2024-09-30 VITALS
BODY MASS INDEX: 39.17 KG/M2 | HEART RATE: 60 BPM | WEIGHT: 315 LBS | DIASTOLIC BLOOD PRESSURE: 80 MMHG | SYSTOLIC BLOOD PRESSURE: 143 MMHG | HEIGHT: 75 IN

## 2024-09-30 DIAGNOSIS — S83.241A ACUTE MEDIAL MENISCUS TEAR OF RIGHT KNEE, INITIAL ENCOUNTER: Primary | ICD-10-CM

## 2024-09-30 DIAGNOSIS — M25.561 RIGHT KNEE PAIN, UNSPECIFIED CHRONICITY: ICD-10-CM

## 2024-09-30 PROCEDURE — 1159F MED LIST DOCD IN RCRD: CPT | Mod: CPTII,,, | Performed by: NURSE PRACTITIONER

## 2024-09-30 PROCEDURE — 3079F DIAST BP 80-89 MM HG: CPT | Mod: CPTII,,, | Performed by: NURSE PRACTITIONER

## 2024-09-30 PROCEDURE — 3077F SYST BP >= 140 MM HG: CPT | Mod: CPTII,,, | Performed by: NURSE PRACTITIONER

## 2024-09-30 PROCEDURE — 73564 X-RAY EXAM KNEE 4 OR MORE: CPT | Mod: RT,,, | Performed by: ORTHOPAEDIC SURGERY

## 2024-09-30 PROCEDURE — 3008F BODY MASS INDEX DOCD: CPT | Mod: CPTII,,, | Performed by: NURSE PRACTITIONER

## 2024-09-30 PROCEDURE — 4010F ACE/ARB THERAPY RXD/TAKEN: CPT | Mod: CPTII,,, | Performed by: NURSE PRACTITIONER

## 2024-09-30 PROCEDURE — 99213 OFFICE O/P EST LOW 20 MIN: CPT | Mod: ,,, | Performed by: NURSE PRACTITIONER

## 2024-09-30 RX ORDER — FEBUXOSTAT 80 MG/1
1 TABLET, FILM COATED ORAL
COMMUNITY
Start: 2024-09-09

## 2024-09-30 NOTE — PROGRESS NOTES
Chief Complaint:   Chief Complaint   Patient presents with    Right Knee - Pain     Ongoing for about a month ago thinks he has torn his mensicus again. Stated has been having a lot of popping. Reports swelling in front and back of knee. Denies any numbness or tingling. Is taking tylenol but has not helped with pain.        Consulting Physician: No ref. provider found    History of present illness:    he  is a pleasant 54 y.o. year old male  who presents with new right knee pain since August 2024. He was stepping up onto equipment and twisted the knee and felt a pop. He had immediate pain that has progressed. His pain is medial in the knee, anterior and posterior. Worse with increased activity and steps. Not much better at rest. He has tried activity modification, HEP, and OTC medications without relief. S/p PLM, shaving chondroplasty trochlea 2/25/21.    Past Medical History:   Diagnosis Date    AVN (avascular necrosis of bone)     HTN (hypertension)        Past Surgical History:   Procedure Laterality Date    JOINT REPLACEMENT  11/2021    Hip replacement    KIDNEY SURGERY      KIDNEY TRANSPLANT  02/1996    Donated a kidney    medial menisectomy Right 02/25/2021    right hip stent      TOTAL HIP ARTHROPLASTY Left 11/08/2021       Current Outpatient Medications   Medication Sig    clopidogreL (PLAVIX) 75 mg tablet Take 75 mg by mouth once daily.    febuxostat (ULORIC) 80 mg Tab Take 1 tablet by mouth.    lisinopriL (PRINIVIL,ZESTRIL) 40 MG tablet Take 40 mg by mouth once daily.    nebivoloL (BYSTOLIC) 10 MG Tab Take 10 mg by mouth once daily.    esomeprazole (NEXIUM) 40 MG capsule Take 1 capsule (40 mg total) by mouth before breakfast.     No current facility-administered medications for this visit.       Review of patient's allergies indicates:   Allergen Reactions    Codeine Hives, Nausea And Vomiting and Nausea Only       Family History   Problem Relation Name Age of Onset    Arthritis Mother Sharon LeJeune      "Cancer Mother Sharon LeJeune     Heart disease Father Larry LeJeune     Kidney disease Father Larry LeJeune     Diabetes Paternal Grandfather Ivy LeJeune        Social History     Socioeconomic History    Marital status:    Tobacco Use    Smoking status: Some Days     Current packs/day: 1.00     Average packs/day: 1 pack/day for 15.0 years (15.0 ttl pk-yrs)     Types: Cigarettes    Smokeless tobacco: Never    Tobacco comments:     Tobacco/dip   Substance and Sexual Activity    Alcohol use: Yes     Alcohol/week: 6.0 standard drinks of alcohol     Types: 6 Cans of beer per week    Drug use: Never    Sexual activity: Yes     Partners: Female     Birth control/protection: None       Review of Systems:    Constitution:   Denies chills, fever, and sweats.  HENT:   Denies headaches or blurry vision.  Cardiovascular:  Denies chest pain or irregular heart beat.  Respiratory:   Denies cough or shortness of breath.  Gastrointestinal:  Denies abdominal pain, nausea, or vomiting.  Musculoskeletal:   Denies muscle cramps.  Neurological:   Denies dizziness or focal weakness.  Psychiatric/Behavior: Normal mental status.  Hematology/Lymph:  Denies bleeding problem or easy bruising/bleeding.  Skin:    Denies rash or suspicious lesions.    Examination:    Vital Signs:    Vitals:    09/30/24 0753   BP: (!) 143/80   Pulse: 60   Weight: (!) 143.4 kg (316 lb 3.2 oz)   Height: 6' 3" (1.905 m)       Body mass index is 39.52 kg/m².    Constitution:   Well-developed, well nourished patient in no acute distress.  Neurological:   Alert and oriented x 3 and cooperative to examination.     Psychiatric/Behavior: Normal mental status.  Respiratory:   No shortness of breath.  Cards:    Pulses palpable and symmetric, brisk cap refill   Eyes:    Extraoccular muscles intact  Skin:    No scars, rash or suspicious lesions.    MSK:   Standing exam  stance: normal alignment, no significant leg-length discrepancy  gait: antalgic limp    Knee " examination  - General comments: Effusion    - Tenderness: right medial     Knee                  RIGHT    LEFT  Skin:                  Intact      Intact  ROM:                 0-120      0-130  Effusion:              +            Neg  MJL TTP:           Neg         Neg  LJL TTP:            Neg         Neg  Aline:           ++          Neg  Pat crep:            Neg         Neg  Patella TTPs:     Neg         Neg  Patella grind:      Neg        Neg  Lachman:           Neg        Neg  Pivot shift:          Neg        Neg  Valgus stress:    Neg        Neg  Varus stress:      Neg        Neg  Posterior drawer: Neg       Neg    N-V intact intact  Hip: nml nml    Lower extremity edema:Negative       Imaging: X-rays ordered and images interpreted today personally by me of 4 views of the right knee show minimal osteoarthritis        Assessment: Acute medial meniscus tear of right knee, initial encounter  -     X-Ray Knee Complete 4 Or More Views Right; Future; Expected date: 09/30/2024  -     MRI Knee Without Contrast Right; Future; Expected date: 09/30/2024        Plan:  Concern for medial meniscus tear. We will get an MRI to evaluate and see him back after for results.

## 2024-10-02 ENCOUNTER — OFFICE VISIT (OUTPATIENT)
Dept: ORTHOPEDICS | Facility: CLINIC | Age: 54
End: 2024-10-02
Payer: COMMERCIAL

## 2024-10-02 VITALS
HEART RATE: 53 BPM | SYSTOLIC BLOOD PRESSURE: 139 MMHG | HEIGHT: 75 IN | WEIGHT: 315 LBS | BODY MASS INDEX: 39.17 KG/M2 | DIASTOLIC BLOOD PRESSURE: 86 MMHG

## 2024-10-02 DIAGNOSIS — S83.241A ACUTE MEDIAL MENISCUS TEAR OF RIGHT KNEE, INITIAL ENCOUNTER: Primary | ICD-10-CM

## 2024-10-02 RX ORDER — BETAMETHASONE SODIUM PHOSPHATE AND BETAMETHASONE ACETATE 3; 3 MG/ML; MG/ML
6 INJECTION, SUSPENSION INTRA-ARTICULAR; INTRALESIONAL; INTRAMUSCULAR; SOFT TISSUE
Status: DISCONTINUED | OUTPATIENT
Start: 2024-10-02 | End: 2024-10-02 | Stop reason: HOSPADM

## 2024-10-02 RX ORDER — LIDOCAINE HYDROCHLORIDE 20 MG/ML
5 INJECTION, SOLUTION EPIDURAL; INFILTRATION; INTRACAUDAL; PERINEURAL
Status: DISCONTINUED | OUTPATIENT
Start: 2024-10-02 | End: 2024-10-02 | Stop reason: HOSPADM

## 2024-10-02 RX ADMIN — LIDOCAINE HYDROCHLORIDE 5 ML: 20 INJECTION, SOLUTION EPIDURAL; INFILTRATION; INTRACAUDAL; PERINEURAL at 08:10

## 2024-10-02 RX ADMIN — BETAMETHASONE SODIUM PHOSPHATE AND BETAMETHASONE ACETATE 6 MG: 3; 3 INJECTION, SUSPENSION INTRA-ARTICULAR; INTRALESIONAL; INTRAMUSCULAR; SOFT TISSUE at 08:10

## 2024-10-02 NOTE — PROGRESS NOTES
Chief Complaint:   Chief Complaint   Patient presents with    Right Knee - Results     Here for MRI results of the right knee       Consulting Physician: No ref. provider found    History of present illness:    2/25/2021:  Right knee arthroscopic partial lateral meniscectomy, trochlea chondroplasty    he  is a pleasant 54 y.o. year old male  who presents with new right knee pain since August 2024. He was stepping up onto equipment and twisted the knee and felt a pop. He had immediate pain that has progressed. His pain is medial in the knee, anterior and posterior. Worse with increased activity and steps. Not much better at rest. He has tried activity modification, HEP, and OTC medications without relief.     He returns today status post MRI.  Past Medical History:   Diagnosis Date    AVN (avascular necrosis of bone)     HTN (hypertension)        Past Surgical History:   Procedure Laterality Date    JOINT REPLACEMENT  11/2021    Hip replacement    KIDNEY SURGERY      KIDNEY TRANSPLANT  02/1996    Donated a kidney    medial menisectomy Right 02/25/2021    right hip stent      TOTAL HIP ARTHROPLASTY Left 11/08/2021       Current Outpatient Medications   Medication Sig    clopidogreL (PLAVIX) 75 mg tablet Take 75 mg by mouth once daily.    esomeprazole (NEXIUM) 40 MG capsule Take 1 capsule (40 mg total) by mouth before breakfast.    febuxostat (ULORIC) 80 mg Tab Take 1 tablet by mouth.    lisinopriL (PRINIVIL,ZESTRIL) 40 MG tablet Take 40 mg by mouth once daily.    nebivoloL (BYSTOLIC) 10 MG Tab Take 10 mg by mouth once daily.     No current facility-administered medications for this visit.       Review of patient's allergies indicates:   Allergen Reactions    Codeine Hives, Nausea And Vomiting and Nausea Only       Family History   Problem Relation Name Age of Onset    Arthritis Mother Sharon LeJeune     Cancer Mother Sharon LeJeune     Heart disease Father Larry LeJeune     Kidney disease Father Larry LeJeune      "Diabetes Paternal Grandfather Ivy LeJeune        Social History     Socioeconomic History    Marital status:    Tobacco Use    Smoking status: Some Days     Current packs/day: 1.00     Average packs/day: 1 pack/day for 15.0 years (15.0 ttl pk-yrs)     Types: Cigarettes    Smokeless tobacco: Never    Tobacco comments:     Tobacco/dip   Substance and Sexual Activity    Alcohol use: Yes     Alcohol/week: 6.0 standard drinks of alcohol     Types: 6 Cans of beer per week    Drug use: Never    Sexual activity: Yes     Partners: Female     Birth control/protection: None       Review of Systems:    Constitution:   Denies chills, fever, and sweats.  HENT:   Denies headaches or blurry vision.  Cardiovascular:  Denies chest pain or irregular heart beat.  Respiratory:   Denies cough or shortness of breath.  Gastrointestinal:  Denies abdominal pain, nausea, or vomiting.  Musculoskeletal:   Denies muscle cramps.  Neurological:   Denies dizziness or focal weakness.  Psychiatric/Behavior: Normal mental status.  Hematology/Lymph:  Denies bleeding problem or easy bruising/bleeding.  Skin:    Denies rash or suspicious lesions.    Examination:    Vital Signs:    Vitals:    10/02/24 0747   BP: 139/86   Pulse: (!) 53   Weight: (!) 143.4 kg (316 lb 2.2 oz)   Height: 6' 3" (1.905 m)   PainSc:   7   PainLoc: Knee       Body mass index is 39.51 kg/m².    Constitution:   Well-developed, well nourished patient in no acute distress.  Neurological:   Alert and oriented x 3 and cooperative to examination.     Psychiatric/Behavior: Normal mental status.  Respiratory:   No shortness of breath.  Cards:    Pulses palpable and symmetric, brisk cap refill   Eyes:    Extraoccular muscles intact  Skin:    No scars, rash or suspicious lesions.    MSK:   Standing exam  stance: normal alignment, no significant leg-length discrepancy  gait: antalgic limp    Knee examination  - General comments: Effusion    - Tenderness: right medial     Knee           "        RIGHT    LEFT  Skin:                  Intact      Intact  ROM:                 0-120      0-130  Effusion:              +            Neg  MJL TTP:            +            Neg  LJL TTP:            Neg         Neg  Aline:           ++          Neg  Pat crep:            Neg         Neg  Patella TTPs:     Neg         Neg  Patella grind:      Neg        Neg  Lachman:           Neg        Neg  Pivot shift:          Neg        Neg  Valgus stress:    Neg        Neg  Varus stress:      Neg        Neg  Posterior drawer: Neg       Neg    N-V intact intact  Hip: nml nml    Lower extremity edema:Negative     Imaging: X-rays ordered and images interpreted today personally by me of 4 views of the right knee show minimal osteoarthritis      Assessment: Acute medial meniscus tear of right knee, initial encounter      Plan:  We are going to try an injection today.  If his pain persists, we will consider right knee arthroscopic partial medial meniscectomy

## 2024-10-02 NOTE — PROCEDURES
Large Joint Aspiration/Injection: R knee    Date/Time: 10/2/2024 8:00 AM    Performed by: Nigel Elizondo Jr., MD  Authorized by: Nigel Elizondo Jr., MD    Consent Done?:  Yes (Verbal)  Indications:  Pain  Site marked: the procedure site was marked    Timeout: prior to procedure the correct patient, procedure, and site was verified    Prep: patient was prepped and draped in usual sterile fashion      Local anesthesia used?: Yes    Local anesthetic:  Topical anesthetic    Details:  Needle Size:  21 G  Ultrasonic Guidance for needle placement?: No    Approach:  Lateral  Location:  Knee  Site:  R knee  Medications:  5 mL LIDOcaine (PF) 20 mg/mL (2%) 20 mg/mL (2 %); 6 mg betamethasone acetate-betamethasone sodium phosphate 6 mg/mL  Patient tolerance:  Patient tolerated the procedure well with no immediate complications

## 2024-11-01 ENCOUNTER — HOSPITAL ENCOUNTER (OUTPATIENT)
Dept: RADIOLOGY | Facility: CLINIC | Age: 54
Discharge: HOME | End: 2024-11-01
Attending: NURSE PRACTITIONER
Payer: COMMERCIAL

## 2024-11-01 ENCOUNTER — OFFICE VISIT (OUTPATIENT)
Dept: ORTHOPEDICS | Facility: CLINIC | Age: 54
End: 2024-11-01
Payer: COMMERCIAL

## 2024-11-01 ENCOUNTER — CLINICAL SUPPORT (OUTPATIENT)
Dept: LAB | Facility: HOSPITAL | Age: 54
End: 2024-11-01
Attending: NURSE PRACTITIONER
Payer: COMMERCIAL

## 2024-11-01 VITALS
WEIGHT: 315 LBS | BODY MASS INDEX: 39.17 KG/M2 | DIASTOLIC BLOOD PRESSURE: 85 MMHG | SYSTOLIC BLOOD PRESSURE: 147 MMHG | HEIGHT: 75 IN | HEART RATE: 57 BPM

## 2024-11-01 DIAGNOSIS — S83.281D ACUTE LATERAL MENISCUS TEAR OF RIGHT KNEE, SUBSEQUENT ENCOUNTER: Primary | ICD-10-CM

## 2024-11-01 DIAGNOSIS — Z01.818 PRE-OP TESTING: ICD-10-CM

## 2024-11-01 DIAGNOSIS — S83.242A ACUTE MEDIAL MENISCUS TEAR OF LEFT KNEE, INITIAL ENCOUNTER: ICD-10-CM

## 2024-11-01 DIAGNOSIS — S89.92XA LEFT KNEE INJURY, INITIAL ENCOUNTER: ICD-10-CM

## 2024-11-01 DIAGNOSIS — S83.241D ACUTE MEDIAL MENISCUS TEAR, RIGHT, SUBSEQUENT ENCOUNTER: ICD-10-CM

## 2024-11-01 LAB
ANION GAP SERPL CALC-SCNC: 6 MEQ/L
BUN SERPL-MCNC: 23.6 MG/DL (ref 8.4–25.7)
CALCIUM SERPL-MCNC: 10.2 MG/DL (ref 8.4–10.2)
CHLORIDE SERPL-SCNC: 107 MMOL/L (ref 98–107)
CO2 SERPL-SCNC: 28 MMOL/L (ref 22–29)
CREAT SERPL-MCNC: 1.83 MG/DL (ref 0.72–1.25)
CREAT/UREA NIT SERPL: 13
GFR SERPLBLD CREATININE-BSD FMLA CKD-EPI: 43 ML/MIN/1.73/M2
GLUCOSE SERPL-MCNC: 108 MG/DL (ref 74–100)
OHS QRS DURATION: 120 MS
OHS QTC CALCULATION: 386 MS
POTASSIUM SERPL-SCNC: 5.1 MMOL/L (ref 3.5–5.1)
SODIUM SERPL-SCNC: 141 MMOL/L (ref 136–145)

## 2024-11-01 PROCEDURE — 93005 ELECTROCARDIOGRAM TRACING: CPT

## 2024-11-01 PROCEDURE — 36415 COLL VENOUS BLD VENIPUNCTURE: CPT

## 2024-11-01 PROCEDURE — 80048 BASIC METABOLIC PNL TOTAL CA: CPT

## 2024-11-01 PROCEDURE — 93010 ELECTROCARDIOGRAM REPORT: CPT | Mod: ,,, | Performed by: INTERNAL MEDICINE

## 2024-11-01 PROCEDURE — 73564 X-RAY EXAM KNEE 4 OR MORE: CPT | Mod: LT,,, | Performed by: NURSE PRACTITIONER

## 2024-11-01 RX ORDER — OXYCODONE AND ACETAMINOPHEN 5; 325 MG/1; MG/1
1 TABLET ORAL EVERY 4 HOURS PRN
COMMUNITY

## 2024-11-01 RX ORDER — SODIUM CHLORIDE 9 MG/ML
INJECTION, SOLUTION INTRAVENOUS CONTINUOUS
OUTPATIENT
Start: 2024-11-01

## 2024-11-01 RX ORDER — LIDOCAINE HYDROCHLORIDE 10 MG/ML
3 INJECTION, SOLUTION INFILTRATION; PERINEURAL
Status: DISCONTINUED | OUTPATIENT
Start: 2024-11-01 | End: 2024-11-01 | Stop reason: HOSPADM

## 2024-11-01 RX ORDER — BETAMETHASONE SODIUM PHOSPHATE AND BETAMETHASONE ACETATE 3; 3 MG/ML; MG/ML
6 INJECTION, SUSPENSION INTRA-ARTICULAR; INTRALESIONAL; INTRAMUSCULAR; SOFT TISSUE
Status: DISCONTINUED | OUTPATIENT
Start: 2024-11-01 | End: 2024-11-01 | Stop reason: HOSPADM

## 2024-11-01 RX ADMIN — BETAMETHASONE SODIUM PHOSPHATE AND BETAMETHASONE ACETATE 6 MG: 3; 3 INJECTION, SUSPENSION INTRA-ARTICULAR; INTRALESIONAL; INTRAMUSCULAR; SOFT TISSUE at 08:11

## 2024-11-01 RX ADMIN — LIDOCAINE HYDROCHLORIDE 3 ML: 10 INJECTION, SOLUTION INFILTRATION; PERINEURAL at 08:11

## 2024-11-01 NOTE — H&P (VIEW-ONLY)
Chief Complaint:   Chief Complaint   Patient presents with    Right Knee - Pre-op Exam     Pre- Op: Right knee PMM meniscus tear, confirmed it is the right knee and wants the sx, taking Percocet 5-325 PRN with some relief     Left Knee - Injury     Left knee injury, last week - unknown cause, got off a dozer is was sore in the back didn't think anything of it and got worse, inquiring about an MRI        Consulting Physician: No ref. provider found    History of present illness:    2/25/2021:  Right knee arthroscopic partial lateral meniscectomy, trochlea chondroplasty    he  is a pleasant 54 y.o. year old male  who presents with new right knee pain since August 2024. He was stepping up onto equipment and twisted the knee and felt a pop. He had immediate pain that has progressed. His pain is medial in the knee, anterior and posterior. Worse with increased activity and steps. Not much better at rest. He has tried activity modification, HEP, and OTC medications without relief.     He returns today for pre op. He has continued right knee pain, mainly laterally. The injection helped for 4 days. He also reports new left medial knee pain. May have injured it when stepping down from something.   Past Medical History:   Diagnosis Date    AVN (avascular necrosis of bone)     HTN (hypertension)        Past Surgical History:   Procedure Laterality Date    JOINT REPLACEMENT  11/2021    Hip replacement    KIDNEY SURGERY      KIDNEY TRANSPLANT  02/1996    Donated a kidney    medial menisectomy Right 02/25/2021    right hip stent      TOTAL HIP ARTHROPLASTY Left 11/08/2021       Current Outpatient Medications   Medication Sig    clopidogreL (PLAVIX) 75 mg tablet Take 75 mg by mouth once daily.    esomeprazole (NEXIUM) 40 MG capsule Take 1 capsule (40 mg total) by mouth before breakfast.    febuxostat (ULORIC) 80 mg Tab Take 1 tablet by mouth.    lisinopriL (PRINIVIL,ZESTRIL) 40 MG tablet Take 40 mg by mouth once daily.    nebivoloL  "(BYSTOLIC) 10 MG Tab Take 10 mg by mouth once daily.    oxyCODONE-acetaminophen (PERCOCET) 5-325 mg per tablet Take 1 tablet by mouth every 4 (four) hours as needed for Pain.     No current facility-administered medications for this visit.       Review of patient's allergies indicates:   Allergen Reactions    Codeine Hives, Nausea And Vomiting and Nausea Only       Family History   Problem Relation Name Age of Onset    Arthritis Mother Sharon LeJeune     Cancer Mother Sharon LeJeune     Heart disease Father Larry LeJeune     Kidney disease Father Larry LeJeune     Diabetes Paternal Grandfather Ivy LeJeune        Social History     Socioeconomic History    Marital status:    Tobacco Use    Smoking status: Some Days     Current packs/day: 1.00     Average packs/day: 1 pack/day for 15.0 years (15.0 ttl pk-yrs)     Types: Cigarettes    Smokeless tobacco: Never    Tobacco comments:     Tobacco/dip   Substance and Sexual Activity    Alcohol use: Yes     Alcohol/week: 6.0 standard drinks of alcohol     Types: 6 Cans of beer per week    Drug use: Never    Sexual activity: Yes     Partners: Female     Birth control/protection: None       Review of Systems:    Constitution:   Denies chills, fever, and sweats.  HENT:   Denies headaches or blurry vision.  Cardiovascular:  Denies chest pain or irregular heart beat.  Respiratory:   Denies cough or shortness of breath.  Gastrointestinal:  Denies abdominal pain, nausea, or vomiting.  Musculoskeletal:   Denies muscle cramps.  Neurological:   Denies dizziness or focal weakness.  Psychiatric/Behavior: Normal mental status.  Hematology/Lymph:  Denies bleeding problem or easy bruising/bleeding.  Skin:    Denies rash or suspicious lesions.    Examination:    Vital Signs:    Vitals:    11/01/24 0844 11/01/24 0920   BP: (!) 147/87 (!) 147/85   Pulse: (!) 57 (!) 57   Weight: (!) 143.3 kg (316 lb)    Height: 6' 3" (1.905 m)    PainSc:   8    PainLoc: Knee        Body mass index is " 39.5 kg/m².    Constitution:   Well-developed, well nourished patient in no acute distress.  Neurological:   Alert and oriented x 3 and cooperative to examination.     Psychiatric/Behavior: Normal mental status.  Respiratory:   No shortness of breath.  Cards:    Pulses palpable and symmetric, brisk cap refill   Eyes:    Extraoccular muscles intact  Skin:    No scars, rash or suspicious lesions.    MSK:   Standing exam  stance: normal alignment, no significant leg-length discrepancy  gait: antalgic limp    Knee examination  - General comments: Effusion    - Tenderness: right medial     Knee                  RIGHT    LEFT  Skin:                  Intact      Intact  ROM:                 0-120      0-130  Effusion:              +            Neg  MJL TTP:            +              +  LJL TTP:            Neg         Neg  Aline:           ++           +  Pat crep:            Neg         Neg  Patella TTPs:     Neg         Neg  Patella grind:      Neg        Neg  Lachman:           Neg        Neg  Pivot shift:          Neg        Neg  Valgus stress:    Neg        Neg  Varus stress:      Neg        Neg  Posterior drawer: Neg       Neg    N-V intact intact  Hip: nml nml    Lower extremity edema:Negative     Imaging: X-rays ordered and images interpreted today personally by me of 4 views of the left knee show minimal osteoarthritis      Assessment: Acute lateral meniscus tear of right knee, subsequent encounter  -     Place in Outpatient; Standing  -     Case Request Operating Room: ARTHROSCOPY, KNEE, WITH MENISCECTOMY  -     Vital signs; Standing  -     Cleanse with Chlorhexidine (CHG); Standing  -     Diet NPO; Standing  -     0.9%  NaCl infusion  -     ceFAZolin (Ancef) 2 g in D5W 50 mL IVPB  -     Chlorohexidine Gluconate Bath; Standing  -     Full code; Standing  -     Place sequential compression device; Standing    Acute medial meniscus tear of left knee, initial encounter  -     X-Ray Knee Complete 4 or More Views  Left; Future; Expected date: 11/01/2024  -     Basic Metabolic Panel; Future; Expected date: 11/01/2024  -     EKG 12-lead; Future  -     Large Joint Aspiration/Injection: L knee    Pre-op testing  -     Basic Metabolic Panel; Future; Expected date: 11/01/2024  -     EKG 12-lead; Future    Acute medial meniscus tear, right, subsequent encounter    Other orders  -     IP VTE LOW RISK PATIENT; Standing  -     Cancel: Large Joint Aspiration/Injection      Plan:  We are going to try an injection today in the left knee.  He is agreeable to right knee surgical intervention: right knee arthroscopic partial medial and lateral meniscectomy. We discussed the details of the procedure and the expected postoperative course.  We discussed the benefits of surgery which would be decreased pain and increased function.  We discussed the risks of surgery which is small but could be significant if he has continued pain, stiffness, or infection. After discussion he would like to proceed. Plans for surgery November 12

## 2024-11-04 ENCOUNTER — ANESTHESIA EVENT (OUTPATIENT)
Dept: SURGERY | Facility: HOSPITAL | Age: 54
End: 2024-11-04
Payer: COMMERCIAL

## 2024-11-04 RX ORDER — GLUCOSAMINE/CHONDRO SU A 500-400 MG
1 TABLET ORAL DAILY
COMMUNITY

## 2024-11-12 ENCOUNTER — HOSPITAL ENCOUNTER (OUTPATIENT)
Facility: HOSPITAL | Age: 54
Discharge: HOME | End: 2024-11-12
Attending: ORTHOPAEDIC SURGERY | Admitting: ORTHOPAEDIC SURGERY
Payer: COMMERCIAL

## 2024-11-12 ENCOUNTER — ANESTHESIA (OUTPATIENT)
Dept: SURGERY | Facility: HOSPITAL | Age: 54
End: 2024-11-12
Payer: COMMERCIAL

## 2024-11-12 DIAGNOSIS — S83.241A ACUTE MEDIAL MENISCUS TEAR OF RIGHT KNEE: Primary | ICD-10-CM

## 2024-11-12 DIAGNOSIS — S83.281D ACUTE LATERAL MENISCUS TEAR OF RIGHT KNEE, SUBSEQUENT ENCOUNTER: ICD-10-CM

## 2024-11-12 PROCEDURE — 36000711: Performed by: ORTHOPAEDIC SURGERY

## 2024-11-12 PROCEDURE — 71000016 HC POSTOP RECOV ADDL HR: Performed by: ORTHOPAEDIC SURGERY

## 2024-11-12 PROCEDURE — 71000015 HC POSTOP RECOV 1ST HR: Performed by: ORTHOPAEDIC SURGERY

## 2024-11-12 PROCEDURE — 37000009 HC ANESTHESIA EA ADD 15 MINS: Performed by: ORTHOPAEDIC SURGERY

## 2024-11-12 PROCEDURE — 63600175 PHARM REV CODE 636 W HCPCS

## 2024-11-12 PROCEDURE — 63600175 PHARM REV CODE 636 W HCPCS: Performed by: ORTHOPAEDIC SURGERY

## 2024-11-12 PROCEDURE — 29880 ARTHRS KNE SRG MNISECTMY M&L: CPT | Mod: AS,RT,, | Performed by: NURSE PRACTITIONER

## 2024-11-12 PROCEDURE — 37000008 HC ANESTHESIA 1ST 15 MINUTES: Performed by: ORTHOPAEDIC SURGERY

## 2024-11-12 PROCEDURE — 36000710: Performed by: ORTHOPAEDIC SURGERY

## 2024-11-12 PROCEDURE — 27201423 OPTIME MED/SURG SUP & DEVICES STERILE SUPPLY: Performed by: ORTHOPAEDIC SURGERY

## 2024-11-12 PROCEDURE — 25000003 PHARM REV CODE 250

## 2024-11-12 PROCEDURE — 63600175 PHARM REV CODE 636 W HCPCS: Performed by: ANESTHESIOLOGY

## 2024-11-12 PROCEDURE — 25000003 PHARM REV CODE 250: Performed by: ORTHOPAEDIC SURGERY

## 2024-11-12 PROCEDURE — 71000033 HC RECOVERY, INTIAL HOUR: Performed by: ORTHOPAEDIC SURGERY

## 2024-11-12 PROCEDURE — 29880 ARTHRS KNE SRG MNISECTMY M&L: CPT | Mod: RT,,, | Performed by: ORTHOPAEDIC SURGERY

## 2024-11-12 RX ORDER — FENTANYL CITRATE 50 UG/ML
INJECTION, SOLUTION INTRAMUSCULAR; INTRAVENOUS
Status: DISCONTINUED | OUTPATIENT
Start: 2024-11-12 | End: 2024-11-12

## 2024-11-12 RX ORDER — HYDROMORPHONE HYDROCHLORIDE 2 MG/ML
0.4 INJECTION, SOLUTION INTRAMUSCULAR; INTRAVENOUS; SUBCUTANEOUS EVERY 5 MIN PRN
Status: DISCONTINUED | OUTPATIENT
Start: 2024-11-12 | End: 2024-11-12 | Stop reason: HOSPADM

## 2024-11-12 RX ORDER — ONDANSETRON 4 MG/1
4 TABLET, ORALLY DISINTEGRATING ORAL 2 TIMES DAILY
Qty: 12 TABLET | Refills: 0 | Status: SHIPPED | OUTPATIENT
Start: 2024-11-12

## 2024-11-12 RX ORDER — SODIUM CHLORIDE 0.9 % (FLUSH) 0.9 %
3 SYRINGE (ML) INJECTION EVERY 6 HOURS PRN
Status: DISCONTINUED | OUTPATIENT
Start: 2024-11-12 | End: 2024-11-12 | Stop reason: HOSPADM

## 2024-11-12 RX ORDER — DIPHENHYDRAMINE HYDROCHLORIDE 50 MG/ML
25 INJECTION INTRAMUSCULAR; INTRAVENOUS ONCE
Status: DISCONTINUED | OUTPATIENT
Start: 2024-11-12 | End: 2024-11-12 | Stop reason: HOSPADM

## 2024-11-12 RX ORDER — MIDAZOLAM HYDROCHLORIDE 1 MG/ML
INJECTION INTRAMUSCULAR; INTRAVENOUS
Status: DISCONTINUED | OUTPATIENT
Start: 2024-11-12 | End: 2024-11-12

## 2024-11-12 RX ORDER — OXYCODONE AND ACETAMINOPHEN 5; 325 MG/1; MG/1
1 TABLET ORAL EVERY 6 HOURS PRN
Qty: 20 TABLET | Refills: 0 | Status: SHIPPED | OUTPATIENT
Start: 2024-11-12

## 2024-11-12 RX ORDER — HYDROCODONE BITARTRATE AND ACETAMINOPHEN 5; 325 MG/1; MG/1
1 TABLET ORAL EVERY 4 HOURS PRN
Status: DISCONTINUED | OUTPATIENT
Start: 2024-11-12 | End: 2024-11-12 | Stop reason: HOSPADM

## 2024-11-12 RX ORDER — HYDROCODONE BITARTRATE AND ACETAMINOPHEN 5; 325 MG/1; MG/1
1 TABLET ORAL EVERY 4 HOURS PRN
OUTPATIENT
Start: 2024-11-12

## 2024-11-12 RX ORDER — MORPHINE SULFATE 4 MG/ML
3 INJECTION, SOLUTION INTRAMUSCULAR; INTRAVENOUS
Status: DISCONTINUED | OUTPATIENT
Start: 2024-11-12 | End: 2024-11-12 | Stop reason: HOSPADM

## 2024-11-12 RX ORDER — SODIUM CHLORIDE, SODIUM GLUCONATE, SODIUM ACETATE, POTASSIUM CHLORIDE AND MAGNESIUM CHLORIDE 30; 37; 368; 526; 502 MG/100ML; MG/100ML; MG/100ML; MG/100ML; MG/100ML
INJECTION, SOLUTION INTRAVENOUS CONTINUOUS
Status: DISCONTINUED | OUTPATIENT
Start: 2024-11-12 | End: 2024-11-12 | Stop reason: HOSPADM

## 2024-11-12 RX ORDER — BUPIVACAINE HYDROCHLORIDE 2.5 MG/ML
INJECTION, SOLUTION EPIDURAL; INFILTRATION; INTRACAUDAL
Status: DISCONTINUED
Start: 2024-11-12 | End: 2024-11-12 | Stop reason: HOSPADM

## 2024-11-12 RX ORDER — ACETAMINOPHEN 325 MG/1
650 TABLET ORAL EVERY 4 HOURS PRN
OUTPATIENT
Start: 2024-11-12

## 2024-11-12 RX ORDER — LIDOCAINE HYDROCHLORIDE 10 MG/ML
1 INJECTION, SOLUTION EPIDURAL; INFILTRATION; INTRACAUDAL; PERINEURAL ONCE
OUTPATIENT
Start: 2024-11-12 | End: 2024-11-12

## 2024-11-12 RX ORDER — MEPERIDINE HYDROCHLORIDE 25 MG/ML
12.5 INJECTION INTRAMUSCULAR; INTRAVENOUS; SUBCUTANEOUS ONCE
Status: DISCONTINUED | OUTPATIENT
Start: 2024-11-12 | End: 2024-11-12 | Stop reason: HOSPADM

## 2024-11-12 RX ORDER — ONDANSETRON HYDROCHLORIDE 2 MG/ML
4 INJECTION, SOLUTION INTRAVENOUS DAILY PRN
Status: DISCONTINUED | OUTPATIENT
Start: 2024-11-12 | End: 2024-11-12 | Stop reason: HOSPADM

## 2024-11-12 RX ORDER — ONDANSETRON 4 MG/1
4 TABLET, ORALLY DISINTEGRATING ORAL ONCE
OUTPATIENT
Start: 2024-11-12 | End: 2024-11-12

## 2024-11-12 RX ORDER — DEXAMETHASONE SODIUM PHOSPHATE 4 MG/ML
INJECTION, SOLUTION INTRA-ARTICULAR; INTRALESIONAL; INTRAMUSCULAR; INTRAVENOUS; SOFT TISSUE
Status: DISCONTINUED | OUTPATIENT
Start: 2024-11-12 | End: 2024-11-12

## 2024-11-12 RX ORDER — KETOROLAC TROMETHAMINE 10 MG/1
10 TABLET, FILM COATED ORAL 3 TIMES DAILY
Qty: 15 TABLET | Refills: 0 | Status: SHIPPED | OUTPATIENT
Start: 2024-11-12

## 2024-11-12 RX ORDER — EPINEPHRINE 1 MG/ML
INJECTION, SOLUTION, CONCENTRATE INTRAVENOUS
Status: DISCONTINUED
Start: 2024-11-12 | End: 2024-11-12 | Stop reason: HOSPADM

## 2024-11-12 RX ORDER — PROPOFOL 10 MG/ML
VIAL (ML) INTRAVENOUS
Status: DISCONTINUED | OUTPATIENT
Start: 2024-11-12 | End: 2024-11-12

## 2024-11-12 RX ORDER — SODIUM CHLORIDE, SODIUM GLUCONATE, SODIUM ACETATE, POTASSIUM CHLORIDE AND MAGNESIUM CHLORIDE 30; 37; 368; 526; 502 MG/100ML; MG/100ML; MG/100ML; MG/100ML; MG/100ML
INJECTION, SOLUTION INTRAVENOUS CONTINUOUS
OUTPATIENT
Start: 2024-11-12 | End: 2024-12-12

## 2024-11-12 RX ORDER — TRAMADOL HYDROCHLORIDE 50 MG/1
50 TABLET ORAL EVERY 4 HOURS PRN
Status: DISCONTINUED | OUTPATIENT
Start: 2024-11-12 | End: 2024-11-12 | Stop reason: HOSPADM

## 2024-11-12 RX ORDER — EPINEPHRINE 1 MG/ML
INJECTION, SOLUTION, CONCENTRATE INTRAVENOUS
Status: DISCONTINUED | OUTPATIENT
Start: 2024-11-12 | End: 2024-11-12 | Stop reason: HOSPADM

## 2024-11-12 RX ORDER — PHENYLEPHRINE HCL IN 0.9% NACL 1 MG/10 ML
SYRINGE (ML) INTRAVENOUS
Status: DISCONTINUED | OUTPATIENT
Start: 2024-11-12 | End: 2024-11-12

## 2024-11-12 RX ORDER — MIDAZOLAM HYDROCHLORIDE 2 MG/2ML
2 INJECTION, SOLUTION INTRAMUSCULAR; INTRAVENOUS ONCE AS NEEDED
OUTPATIENT
Start: 2024-11-12 | End: 2036-04-10

## 2024-11-12 RX ORDER — ONDANSETRON HYDROCHLORIDE 2 MG/ML
4 INJECTION, SOLUTION INTRAVENOUS EVERY 12 HOURS PRN
Status: DISCONTINUED | OUTPATIENT
Start: 2024-11-12 | End: 2024-11-12 | Stop reason: HOSPADM

## 2024-11-12 RX ORDER — LIDOCAINE HYDROCHLORIDE 20 MG/ML
INJECTION INTRAVENOUS
Status: DISCONTINUED | OUTPATIENT
Start: 2024-11-12 | End: 2024-11-12

## 2024-11-12 RX ORDER — GLUCAGON 1 MG
1 KIT INJECTION
Status: DISCONTINUED | OUTPATIENT
Start: 2024-11-12 | End: 2024-11-12 | Stop reason: HOSPADM

## 2024-11-12 RX ORDER — MUPIROCIN 20 MG/G
OINTMENT TOPICAL 2 TIMES DAILY
Status: DISCONTINUED | OUTPATIENT
Start: 2024-11-12 | End: 2024-11-12 | Stop reason: HOSPADM

## 2024-11-12 RX ORDER — SODIUM CHLORIDE, SODIUM LACTATE, POTASSIUM CHLORIDE, CALCIUM CHLORIDE 600; 310; 30; 20 MG/100ML; MG/100ML; MG/100ML; MG/100ML
INJECTION, SOLUTION INTRAVENOUS CONTINUOUS
OUTPATIENT
Start: 2024-11-12

## 2024-11-12 RX ORDER — CEFAZOLIN SODIUM 1 G/3ML
2 INJECTION, POWDER, FOR SOLUTION INTRAMUSCULAR; INTRAVENOUS
Status: DISCONTINUED | OUTPATIENT
Start: 2024-11-12 | End: 2024-11-12

## 2024-11-12 RX ORDER — ONDANSETRON HYDROCHLORIDE 2 MG/ML
INJECTION, SOLUTION INTRAVENOUS
Status: DISCONTINUED | OUTPATIENT
Start: 2024-11-12 | End: 2024-11-12

## 2024-11-12 RX ADMIN — LIDOCAINE HYDROCHLORIDE 80 MG: 20 INJECTION INTRAVENOUS at 02:11

## 2024-11-12 RX ADMIN — Medication 100 MCG: at 02:11

## 2024-11-12 RX ADMIN — MIDAZOLAM 2 MG: 1 INJECTION INTRAMUSCULAR; INTRAVENOUS at 02:11

## 2024-11-12 RX ADMIN — DEXAMETHASONE SODIUM PHOSPHATE 8 MG: 4 INJECTION, SOLUTION INTRA-ARTICULAR; INTRALESIONAL; INTRAMUSCULAR; INTRAVENOUS; SOFT TISSUE at 02:11

## 2024-11-12 RX ADMIN — HYDROMORPHONE HYDROCHLORIDE 0.4 MG: 2 INJECTION INTRAMUSCULAR; INTRAVENOUS; SUBCUTANEOUS at 03:11

## 2024-11-12 RX ADMIN — FENTANYL CITRATE 50 MCG: 50 INJECTION, SOLUTION INTRAMUSCULAR; INTRAVENOUS at 02:11

## 2024-11-12 RX ADMIN — PROPOFOL 200 MG: 10 INJECTION, EMULSION INTRAVENOUS at 02:11

## 2024-11-12 RX ADMIN — DEXTROSE MONOHYDRATE 3 G: 5 INJECTION INTRAVENOUS at 02:11

## 2024-11-12 RX ADMIN — ONDANSETRON HYDROCHLORIDE 4 MG: 2 SOLUTION INTRAMUSCULAR; INTRAVENOUS at 02:11

## 2024-11-12 RX ADMIN — Medication 100 MCG: at 03:11

## 2024-11-12 NOTE — TRANSFER OF CARE
"Anesthesia Transfer of Care Note    Patient: Scott Lejeune    Procedure(s) Performed: Procedure(s) (LRB):  ARTHROSCOPY, KNEE, WITH MENISCECTOMY (Right)    Patient location: PACU    Anesthesia Type: general    Transport from OR: Transported from OR on room air with adequate spontaneous ventilation    Post pain: adequate analgesia    Post assessment: no apparent anesthetic complications    Post vital signs: stable    Level of consciousness: responds to stimulation    Nausea/Vomiting: no nausea/vomiting    Complications: none    Transfer of care protocol was followed      Last vitals: Visit Vitals  /83 (BP Location: Right arm, Patient Position: Lying)   Pulse 61   Temp 36.5 °C (97.7 °F) (Tympanic)   Resp 20   Ht 6' 4" (1.93 m)   Wt (!) 140.1 kg (308 lb 13.8 oz)   SpO2 97%   BMI 37.60 kg/m²     "

## 2024-11-12 NOTE — DISCHARGE INSTRUCTIONS
[facility]  Nigel Elizondo Jr., MD  [address] [place],  [id number]  [phone], fax [phone]      KNEE ARTHROSCOPY    DIET:  Following general anesthesia, start with clear liquids to decrease chances of nausea.  Begin with water, coffee, tea, ginger ale, sprite, or apple juice.  If tolerated, advance to Jell-o, soup, crackers, or toast.  Once these are tolerated, advance to a regular diet.    DRESSING:  Keep the dressing clean and dry.  Remove the dressing on the 3rd day after surgery and replace the gauze with bandaids.  If you have steri-strips or band-aids in place of stitches, allow them to stay in place as long as possible.  They usually fall off on their own within 7-10 days.  You may trim the edges as the steri-strips begin to curl.  Steri-strips can get wet in the shower-pat dry with a towel after showers.    SHOWERING:  You may shower 5 days after surgery.  Remove the dressing or band-aids before showering.  Leave the incisions open to air after showering.  You can cover the sutures with band-aids if clothing irritates the stitches.  You do not need to reapply a dressing, but you may do so if you continue to have drainage.  It is not uncommon to have drainage a few days after surgery.      ACTIVITY:            -  Ice should be applied to the knee for 30 minutes, 5-6 times per day, for the 1st week to help decrease pain and swelling.  After the first week, apply as needed, especially after exercises and physical therapy.            -  Elevate the knee with 2-3 pillows under the ANKLE.  Do not elevate with pillows under the knee, this will keep the knee in a bent position.  It is important that the knee can be fully straightened in the early post op period.            -  Use a cane or crutches for comfort only.  You can stop using them when you are walking without a limp.  There are no restrictions on your walking.            -  You can bear full weight on your leg.       PAIN MEDICATION:       -  Use the Percocet as prescribed for pain after surgery.  Pain medicine can cause nausea and vomiting, especially on an empty stomach.       -  In addition, you can take Ketorolac as prescribed or Iburpofen 200 mg, 4 pills every 8 hours.  Ketorolac or Iburpofen medicine can irritate your stomach or cause heartburn.  If this happens to you, stop taking the medicine.       -  Ice and elevation are more useful than pain medicine for surgical pain.  If you are having too much pain or discomfort, try more ice and higher elevation.       -  Do NOT drink alcohol while taking pain medication.    BLOOD CLOT PREVENTION:  If you are aware that you are at high risk for blood blots, notify your physician.  In general, you should walk as much as possible after surgery to increase blood circulation throughout your body. Most patients will take Aspirin 81 mg, 1 pill twice a day for 2 weeks to help further prevent blood clots.    DRIVING OR FLYING:  You must NEVER drive while taking narcotic pain medication  You may ride in a car, take a train, or fly once you feel comfortable    PHYSICAL THERAPY:  Take your physical therapy prescription to the physical therapy clinic of your choice.  Make an appointment 1-2 days after surgery.  All exercises and activities must be within the protocol until rehab is complete.  Some patients will not start physical therapy until after their first follow up appointment.    WORK OR SCHOOL:  You may return to an office job or school whenver comfortable.  Most patients return ~ 1 week after surgery.  For more active jobs that require extended walking, squatting, or lifting, you can wait until after your follow up appointment.  Any other types of jobs should be discussed with Dr. Elizondo to determine a date for return to work.    PROBLEMS TO REPORT:       1.  Fever greater than 102 F       2.  Incision that is very red and/or draining pus       3.  Unable to urinate within 8  hours of surgery (a rare effect of being put to sleep for surgery)  Calls should be directed to the clinic:  [phone]    RETURN APPOINTMENT:  To confirm or reschedule your appointment, call [phone]

## 2024-11-12 NOTE — ANESTHESIA PREPROCEDURE EVALUATION
11/12/2024  Scott Lejeune is a 54 y.o., male.  Procedure Information    Case: [id number] Date/Time: 11/12/24 2035   Procedure: ARTHROSCOPY, KNEE, WITH MENISCECTOMY (Right)   Anesthesia type: Gen Supraglottic Airway   Diagnosis: Acute medial meniscus tear of right knee, initial encounter [S83.241A]   Pre-op diagnosis: Acute medial meniscus tear of right knee, initial encounter [S8.182Q]   Location: [facility] OR [address] [place] [address] OR   Surgeons: Nigel Elizondo Jr., MD       Pre-op Assessment    I have reviewed the Patient Summary Reports.     I have reviewed the Nursing Notes. I have reviewed the NPO Status.   I have reviewed the Medications.     Review of Systems  Anesthesia Hx:  No problems with previous Anesthesia                Hematology/Oncology:  Hematology Normal   Oncology Normal                                   EENT/Dental:  EENT/Dental Normal           Cardiovascular:  Exercise tolerance: good   Hypertension                  Functional Capacity good / => 4 METS                         Pulmonary:  Pulmonary Normal                       Renal/:   Denies Chronic Renal Disease.                Hepatic/GI:  Hepatic/GI Normal                    Musculoskeletal:  Musculoskeletal Normal                Neurological:  Neurology Normal                                      Endocrine:  Endocrine Normal          Denies Morbid Obesity / BMI > 40  Dermatological:  Skin Normal    Psych:  Psychiatric Normal                    Physical Exam  General: Alert, Oriented, Well nourished and Cooperative    Airway:  Mallampati: II   Mouth Opening: Normal  TM Distance: Normal  Tongue: Normal  Neck ROM: Normal ROM    Dental:  Intact    Chest/Lungs:  Clear to auscultation, Normal Respiratory Rate    Heart:  Rate: Normal  Rhythm: Regular Rhythm       Latest Reference Range & Units 11/10/21 05:35   WBC 4.5 - 11.5 x10(3)/mcL 6.5   RBC 4.70 -  6.10 x10(6)/mcL 3.04 (L)   Hemoglobin 14.0 - 18.0 gm/dL 8.5 (L)   Hematocrit 42.0 - 52.0 % 26.5 (L)   MCV 80.0 - 94.0 fL 87.2   MCH 27.0 - 31.0 pg 28.0   MCHC 33.0 - 36.0 gm/dL 32.1 (L)   RDW 11.5 - 17.0 % 13.2   Platelet Count 130 - 400 x10(3)/mcL 146   MPV 7.4 - 10.4 fL 10.4   Gran # (ANC) 2.10 - 9.20 x10(3)/mcL 3.74   nRBC 0.0 - 0.2 % 0.0   Sodium 136 - 145 mmol/L 135 (L)   Potassium 3.5 - 5.1 mmol/L 4.6   Chloride 98 - 107 mmol/L 107   CO2 22 - 29 mmol/L 23   BUN 8.4 - 25.7 mg/dL 27.3 (H)   Creatinine 0.72 - 1.25 mg/dL 1.76 (H)   BUN/CREAT RATIO  16   eGFR if non African American mL/min/1.73 m2 43   eGFR if African American  52   Glucose 74 - 100 mg/dL 100   Calcium 8.4 - 10.2 mg/dL 8.5   (L): Data is abnormally low  (H): Data is abnormally high  est Reason : S83.242A,Z01.818,    Vent. Rate : 056 BPM     Atrial Rate : 056 BPM     P-R Int : 152 ms          QRS Dur : 120 ms      QT Int : 400 ms       P-R-T Axes : 026 016 038 degrees     QTc Int : 386 ms    Sinus bradycardia  Low voltage QRS  Abnormal ECG  No previous ECGs available  Confirmed by Isaiah Yi MD ([id number]) on 11/1/2024 7:55:57 PM    Referred By: [facility]ON           Confirmed By:Isaiah Yi MD     Specimen Collected: 11/01/24 09:41 CDT       Anesthesia Plan  Type of Anesthesia, risks & benefits discussed:    Anesthesia Type: Gen Supraglottic Airway  Intra-op Monitoring Plan: Standard ASA Monitors  Post Op Pain Control Plan: multimodal analgesia  Induction:  IV and Inhalation  Airway Plan: Direct  Informed Consent: Informed consent signed with the Patient and all parties understand the risks and agree with anesthesia plan.  All questions answered. Patient consented to blood products? Yes  ASA Score: 2  Day of Surgery Review of History & Physical: H&P Update referred to the surgeon/provider.I have interviewed and examined the patient. I have reviewed the patient's H&P dated: There are no significant changes.     Ready For Surgery From Anesthesia  Perspective.     .

## 2024-11-12 NOTE — OP NOTE
DATE OF SERVICE: 11/12/2024    SURGEON: Nigel Elizondo MD     ASSISTANT: Stephanie Schmidt NP. Mrs. Schmidt was essential in manipulating the leg while I performed the arthroscopy, retraction, and closure.    PREOPERATIVE DIAGNOSIS:   Right knee medial meniscus tear    POSTOPERATIVE DIAGNOSIS:   Right knee medial and meniscus tear, medial and lateral femoral condyle chondromalacia,     PROCEDURE PERFORMED:  1. Right knee arthroscopic partial medial and lateral meniscectomy    ESTIMATED BLOOD LOSS: 10cc.    COMPLICATIONS: None.    TOURNIQUET TIME: 30 minutes.    ANTIBIOTICS: Ancef     INDICATIONS FOR PROCEDURE: Scott Lejeune is a 54 y.o. year old who has had ongoing right knee pain. The patient has had to limit activity due to intermittent pain & occasional mechanical symptoms. An MRI was performed that showed a meniscus tear that I felt would be amenable to arthroscopic debridement. The patient decided to opt for surgery after failing conservative management.    OPERATIVE REPORT: Scott Lejeune was initially seen in the preoperative holding area where history and physical were reviewed without change. The operative leg was marked, consents were reviewed, and any questions were answered for the patient and family. The patient was then taken back to the operating room, placed supine on the operating table with all bony prominences padded. Then a nonsterile tourniquet was placed around the upper thigh. The patient was induced under general anesthesia. The operative lower extremity was prepped and draped in standard sterile fashion. A timeout led by the surgeon was performed, and preoperative antibiotics were given. The limp was exsanguinated with gravity. The tourniquet was raised to 100 mmHg over the systolic blood pressure.    The knee was then flexed and the inferolateral portal was created with an #11 blade scalpel.    The camera was introduced, using the blunt trocar, into the suprapatellar pouch. The undersurface of the  patella was found to have scattered grade 2/3 chondral wear and the trochlear groove was found to have scattered grade 2/3 chondral wear . The camera was then taken down into the lateral gutter, where no loose bodies and no plica were found. The camera was then brought into the medial gutter, where no loose bodies and no plica were seen. The camera was then brought into the medial compartment.    An inferomedial portal was then localized with a spinal needle, and created using an #11 blade. The medial meniscus was probed and found to have a radial tear of the posterior horn with an unstable flap that was flipped superiorly and medially.  He has a combination of a biter and shaver to resect approximately 60% of the medial meniscus.  The majority of the posterior horn was resected due to the complexity of the tear. The medial femoral condyle was found to have scattered grade 2/3. The medial tibial plateau demonstrated no wear.    The camera was then turned to the notch, where the ACL was found to be intact.    Then the leg was brought into figure-of-four position. The camera was brought into the lateral compartment. The lateral meniscus was probed and found to have horizontal tear of the midbody. A combination of baskets and zachery were used to debride the meniscal flap down to a stable margin of approximately 70 percent remaining and then shaver was used to smooth the edges. The lateral femoral condyle was found to have scattered grade 2/3 chondral wear. The lateral tibial plateau had minimal chondral wear.    The knee was once more examined for loose bodies, of which none were found. The knee was then evacuated of all fluids. The portals were closed with 3-0 monocyrl interrupted sutures and steristrips. 10mL of 0.25% Bupivicaine were infiltrated around each portal. A sterile dressing was placed, and the tourniquet was deflated after 30 minutes. The patient was awakened from anesthesia and taken to the postoperative  care unit in stable condition.

## 2024-11-12 NOTE — ANESTHESIA PROCEDURE NOTES
Intubation    Date/Time: 11/12/2024 2:14 PM    Performed by: Mara Khanna CRNA  Authorized by: David Cosme MD    Intubation:     Induction:  Intravenous    Intubated:  Postinduction    Mask Ventilation:  Not attempted    Attempts:  1    Attempted By:  CRNA    Difficult Airway Encountered?: No      Complications:  None    Airway Device:  Supraglottic airway/LMA    Airway Device Size:  4.0    Placement Verified By:  Capnometry    Complicating Factors:  None    Findings Post-Intubation:  BS equal bilateral and atraumatic/condition of teeth unchanged

## 2024-11-12 NOTE — DISCHARGE SUMMARY
[facility] - Periop Services  Discharge Note  Short Stay    Procedure(s) (LRB):  ARTHROSCOPY, KNEE, WITH MENISCECTOMY (Right)      OUTCOME: Patient tolerated treatment/procedure well without complication and is now ready for discharge.    DISPOSITION: Home or Self Care    FINAL DIAGNOSIS:  <principal problem not specified>    FOLLOWUP: In clinic    DISCHARGE INSTRUCTIONS:  No discharge procedures on file.     TIME SPENT ON DISCHARGE: 10 minutes

## 2024-11-13 VITALS
OXYGEN SATURATION: 98 % | WEIGHT: 308.88 LBS | DIASTOLIC BLOOD PRESSURE: 77 MMHG | TEMPERATURE: 98 F | HEIGHT: 76 IN | SYSTOLIC BLOOD PRESSURE: 121 MMHG | RESPIRATION RATE: 18 BRPM | HEART RATE: 67 BPM | BODY MASS INDEX: 37.61 KG/M2

## 2024-11-15 NOTE — ANESTHESIA POSTPROCEDURE EVALUATION
Anesthesia Post Evaluation    Patient: Scott Lejeune    Procedure(s) Performed: Procedure(s) (LRB):  ARTHROSCOPY, KNEE, WITH MENISCECTOMY (Right)    Final Anesthesia Type: general      Patient location during evaluation: PACU  Patient participation: Yes- Able to Participate  Level of consciousness: awake and alert and oriented  Post-procedure vital signs: reviewed and stable  Pain management: adequate  Airway patency: patent  MOLLY mitigation strategies: Verification of full reversal of neuromuscular block  PONV status at discharge: No PONV  Anesthetic complications: no      Cardiovascular status: blood pressure returned to baseline and stable  Respiratory status: spontaneous ventilation and unassisted  Hydration status: euvolemic  Follow-up not needed.  Comments: [facility]              Vitals Value Taken Time   /77 11/12/24 1616   Temp  11/15/24 1019   Pulse 74 11/12/24 1618   Resp 18 11/12/24 1615   SpO2 88 % 11/12/24 1618   Vitals shown include unfiled device data.      Event Time   Out of Recovery 15:35:00         Pain/Fabian Score: No data recorded

## 2024-11-25 ENCOUNTER — OFFICE VISIT (OUTPATIENT)
Dept: ORTHOPEDICS | Facility: CLINIC | Age: 54
End: 2024-11-25
Payer: COMMERCIAL

## 2024-11-25 VITALS
WEIGHT: 308.88 LBS | DIASTOLIC BLOOD PRESSURE: 81 MMHG | HEIGHT: 76 IN | BODY MASS INDEX: 37.61 KG/M2 | HEART RATE: 58 BPM | SYSTOLIC BLOOD PRESSURE: 153 MMHG

## 2024-11-25 DIAGNOSIS — Z98.890 STATUS POST ARTHROSCOPIC PARTIAL LATERAL MENISCECTOMY OF RIGHT KNEE: Primary | ICD-10-CM

## 2024-11-25 DIAGNOSIS — Z87.828 STATUS POST ARTHROSCOPIC PARTIAL LATERAL MENISCECTOMY OF RIGHT KNEE: Primary | ICD-10-CM

## 2024-11-25 DIAGNOSIS — Z87.828 STATUS POST ARTHROSCOPIC PARTIAL MEDIAL MENISCECTOMY OF RIGHT KNEE: ICD-10-CM

## 2024-11-25 DIAGNOSIS — Z98.890 STATUS POST ARTHROSCOPIC PARTIAL MEDIAL MENISCECTOMY OF RIGHT KNEE: ICD-10-CM

## 2024-11-25 NOTE — PROGRESS NOTES
Chief Complaint:   Chief Complaint   Patient presents with    Right Knee - Post-op Evaluation     10 days sp  Right knee PMM, PLM - sx 11/13/24 gl 12/28/24, not as good as it feels it should be, pressure pain in the back of the knee and front on the knee cap and pushing back causes pain, PT can't squat, PT 2 times a week, not taking anything for pain       History of present illness:  11/12/24: Right kneee arthroscopic partial medial and lateral meniscectomy     He returns today. He does still have some continued pain and swelling although improving. Working with PT.     Musculoskeletal:   Incisions healed.  Without erythema, drainage, or signs of infection. Distally neurovascular intact. ROM 0-90         Assessment: Status post arthroscopic partial lateral meniscectomy of right knee    Status post arthroscopic partial medial meniscectomy of right knee        Plan:  Doing well s/p above. Continue PT. Ok for him to return to work next week. Follow up in 1 month for final check.

## 2024-11-25 NOTE — LETTER
November 25, 2024       [facility]  [address], [address]  [facility] [place] [address]  Phone: [phone]  Fax: [phone]       Patient: Scott Scott Lejeune   YOB: 1970  Date of Visit: 11/25/2024    To Whom It May Concern:    Scott Lejeune  was at [facility] on 11/25/2024. The patient may return to work on 12/2/24 with no restrictions. If you have any questions or concerns, or if I can be of further assistance, please do not hesitate to contact me.    Sincerely,    Nigel Elizondo M.D.

## 2024-12-23 ENCOUNTER — OFFICE VISIT (OUTPATIENT)
Dept: ORTHOPEDICS | Facility: CLINIC | Age: 54
End: 2024-12-23
Payer: COMMERCIAL

## 2024-12-23 VITALS
SYSTOLIC BLOOD PRESSURE: 124 MMHG | BODY MASS INDEX: 37.61 KG/M2 | WEIGHT: 308.88 LBS | DIASTOLIC BLOOD PRESSURE: 78 MMHG | HEART RATE: 66 BPM | HEIGHT: 76 IN

## 2024-12-23 DIAGNOSIS — Z87.828 STATUS POST ARTHROSCOPIC PARTIAL LATERAL MENISCECTOMY OF RIGHT KNEE: ICD-10-CM

## 2024-12-23 DIAGNOSIS — Z98.890 STATUS POST ARTHROSCOPIC PARTIAL LATERAL MENISCECTOMY OF RIGHT KNEE: ICD-10-CM

## 2024-12-23 DIAGNOSIS — Z98.890 STATUS POST ARTHROSCOPIC PARTIAL MEDIAL MENISCECTOMY OF RIGHT KNEE: Primary | ICD-10-CM

## 2024-12-23 DIAGNOSIS — Z87.828 STATUS POST ARTHROSCOPIC PARTIAL MEDIAL MENISCECTOMY OF RIGHT KNEE: Primary | ICD-10-CM

## 2024-12-23 PROCEDURE — 99024 POSTOP FOLLOW-UP VISIT: CPT | Mod: ,,, | Performed by: ORTHOPAEDIC SURGERY

## 2024-12-23 PROCEDURE — 1159F MED LIST DOCD IN RCRD: CPT | Mod: CPTII,,, | Performed by: ORTHOPAEDIC SURGERY

## 2024-12-23 PROCEDURE — 3074F SYST BP LT 130 MM HG: CPT | Mod: CPTII,,, | Performed by: ORTHOPAEDIC SURGERY

## 2024-12-23 PROCEDURE — 3078F DIAST BP <80 MM HG: CPT | Mod: CPTII,,, | Performed by: ORTHOPAEDIC SURGERY

## 2024-12-23 PROCEDURE — 4010F ACE/ARB THERAPY RXD/TAKEN: CPT | Mod: CPTII,,, | Performed by: ORTHOPAEDIC SURGERY

## 2024-12-23 RX ORDER — MELOXICAM 15 MG/1
15 TABLET ORAL DAILY
Qty: 42 TABLET | Refills: 0 | Status: SHIPPED | OUTPATIENT
Start: 2024-12-23

## 2024-12-23 NOTE — PROGRESS NOTES
"Chief Complaint:   Chief Complaint   Patient presents with    Right Knee - Follow-up     6 weeks sp Right knee PMM, PLM - sx 11/13/24 gl 12/28/24, "some days a rey, some days a stone, depending on the swelling, denies wearing a brace/wrap, ices it every night which causes a decrease in swelling, elevates it at night as well, takes Percocet PRN with relief, PT 2 times a week       History of present illness:  11/12/24: Right kneee arthroscopic partial medial and lateral meniscectomy     He returns today. He does still have some continued pain and swelling although improving. Working with PT.     Musculoskeletal:   Incisions healed.  Without erythema, drainage, or signs of infection. Distally neurovascular intact. ROM 0-120         Assessment: Status post arthroscopic partial medial meniscectomy of right knee    Status post arthroscopic partial lateral meniscectomy of right knee    Other orders  -     meloxicam (MOBIC) 15 MG tablet; Take 1 tablet (15 mg total) by mouth once daily.  Dispense: 42 tablet; Refill: 0        Plan:  Doing well s/p above.  We are going to start some Mobic for swelling.  I will see him back in 6 weeks or so for recheck  "

## 2025-02-03 ENCOUNTER — OFFICE VISIT (OUTPATIENT)
Dept: ORTHOPEDICS | Facility: CLINIC | Age: 55
End: 2025-02-03
Payer: COMMERCIAL

## 2025-02-03 VITALS
WEIGHT: 308.88 LBS | SYSTOLIC BLOOD PRESSURE: 128 MMHG | HEART RATE: 71 BPM | DIASTOLIC BLOOD PRESSURE: 77 MMHG | HEIGHT: 76 IN | BODY MASS INDEX: 37.61 KG/M2

## 2025-02-03 DIAGNOSIS — Z98.890 STATUS POST ARTHROSCOPIC PARTIAL LATERAL MENISCECTOMY OF RIGHT KNEE: ICD-10-CM

## 2025-02-03 DIAGNOSIS — Z87.828 STATUS POST ARTHROSCOPIC PARTIAL LATERAL MENISCECTOMY OF RIGHT KNEE: ICD-10-CM

## 2025-02-03 DIAGNOSIS — Z98.890 STATUS POST ARTHROSCOPIC PARTIAL MEDIAL MENISCECTOMY OF RIGHT KNEE: Primary | ICD-10-CM

## 2025-02-03 DIAGNOSIS — Z87.828 STATUS POST ARTHROSCOPIC PARTIAL MEDIAL MENISCECTOMY OF RIGHT KNEE: Primary | ICD-10-CM

## 2025-02-03 PROCEDURE — 1159F MED LIST DOCD IN RCRD: CPT | Mod: CPTII,,, | Performed by: ORTHOPAEDIC SURGERY

## 2025-02-03 PROCEDURE — 3008F BODY MASS INDEX DOCD: CPT | Mod: CPTII,,, | Performed by: ORTHOPAEDIC SURGERY

## 2025-02-03 PROCEDURE — 3074F SYST BP LT 130 MM HG: CPT | Mod: CPTII,,, | Performed by: ORTHOPAEDIC SURGERY

## 2025-02-03 PROCEDURE — 4010F ACE/ARB THERAPY RXD/TAKEN: CPT | Mod: CPTII,,, | Performed by: ORTHOPAEDIC SURGERY

## 2025-02-03 PROCEDURE — 99024 POSTOP FOLLOW-UP VISIT: CPT | Mod: ,,, | Performed by: ORTHOPAEDIC SURGERY

## 2025-02-03 PROCEDURE — 3078F DIAST BP <80 MM HG: CPT | Mod: CPTII,,, | Performed by: ORTHOPAEDIC SURGERY

## 2025-02-03 NOTE — PROGRESS NOTES
Chief Complaint:   Chief Complaint   Patient presents with    Right Knee - Follow-up     3 months sp Right knee PMM, PLM - sx 11/13/24 OOGL, takes Mobic once or twice a week which decreased swelling, denies concerns, sometimes pops loud but denies pain, takes PLAVIX       Consulting Physician: No ref. provider found    History of present illness:  11/12/24: Right kneee arthroscopic partial medial and lateral meniscectomy     He returns today.  He has finished up his therapy.  He is using the Mobic intermittently which has helped with his swelling.  He is not having much in the way of pain.    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    esomeprazole (NEXIUM) 40 MG capsule Take 1 capsule (40 mg total) by mouth before breakfast.    febuxostat (ULORIC) 80 mg Tab Take 1 tablet by mouth once daily.    glucosamine-chondroitin 500-400 mg tablet Take 1 tablet by mouth Daily.    lisinopriL (PRINIVIL,ZESTRIL) 40 MG tablet Take 40 mg by mouth once daily.    meloxicam (MOBIC) 15 MG tablet Take 1 tablet (15 mg total) by mouth once daily.    nebivoloL (BYSTOLIC) 10 MG Tab Take 10 mg by mouth once daily.    ketorolac (TORADOL) 10 mg tablet Take 1 tablet (10 mg  total) by mouth 3 (three) times daily.    ondansetron (ZOFRAN-ODT) 4 MG TbDL Take 1 tablet (4 mg total) by mouth 2 (two) times daily.    oxyCODONE-acetaminophen (PERCOCET) 5-325 mg per tablet Take 1 tablet by mouth every 6 (six) hours as needed for Pain.    oxyCODONE-acetaminophen (PERCOCET) 5-325 mg per tablet Take 1 tablet by mouth every 6 (six) hours as needed for Pain.     No current facility-administered medications for this visit.       Review of patient's allergies indicates:   Allergen Reactions    Codeine Hives, Nausea And Vomiting and Nausea Only       Family History   Problem Relation Name Age of Onset    Arthritis Mother Sharon LeJeune     Cancer Mother Sharon LeJeune     Heart disease Father Larry LeJeune     Kidney disease Father Larry LeJeune     Diabetes Paternal Grandfather Ivy LeJeune        Social History     Socioeconomic History    Marital status:    Tobacco Use    Smoking status: Some Days     Current packs/day: 1.00     Average packs/day: 1 pack/day for 15.0 years (15.0 ttl pk-yrs)     Types: Cigarettes    Smokeless tobacco: Never    Tobacco comments:     Tobacco/dip   Substance and Sexual Activity    Alcohol use: Yes     Alcohol/week: 6.0 standard drinks of alcohol     Types: 6 Cans of beer per week    Drug use: Yes     Types: Oxycodone    Sexual activity: Yes     Partners: Female     Birth control/protection: None       Review of Systems:    Constitution:   Denies chills, fever, and sweats.  HENT:   Denies headaches or blurry vision.  Cardiovascular:  Denies chest pain or irregular heart beat.  Respiratory:   Denies cough or shortness of breath.  Gastrointestinal:  Denies abdominal pain, nausea, or vomiting.  Musculoskeletal:   Denies muscle cramps.  Neurological:   Denies dizziness or focal weakness.  Psychiatric/Behavior: Normal mental status.  Hematology/Lymph:  Denies bleeding problem or easy bruising/bleeding.  Skin:    Denies rash or suspicious lesions.    Examination:    Vital  "Signs:    Vitals:    02/03/25 0751   BP: 128/77   Pulse: 71   Weight: (!) 140.1 kg (308 lb 13.8 oz)   Height: 6' 4" (1.93 m)   PainSc: 0-No pain       Body mass index is 37.6 kg/m².    Constitution:   Well-developed, well nourished patient in no acute distress.  Neurological:   Alert and oriented x 3 and cooperative to examination.     Psychiatric/Behavior: Normal mental status.  Respiratory:   No shortness of breath.  Cards:   Pulses palpable, brisk cap refill  Eyes:    Extraoccular muscles intact  Skin:    No scars, rash or suspicious lesions.    MSK:   Standing exam  stance: normal alignment, no significant leg-length discrepancy  gait: Normal    Knee examination  - General comments: unremarkable appearance    - Tenderness:None    Knee                  RIGHT    LEFT  Skin:                  Intact      Intact  ROM:                 0-130      0-130  Effusion:             Neg        Neg  MJL TTP:           Neg         Neg  LJL TTP:            Neg         Neg  Aline:         Neg         Neg  Pat crep:            Neg         Neg  Patella TTPs:     Neg         Neg  Patella grind:      Neg        Neg  Lachman:           Neg        Neg  Pivot shift:          Neg        Neg  Valgus stress:    Neg        Neg  Varus stress:      Neg        Neg  Posterior drawer: Neg       Neg    N-V intact intact  Hip: nml nml    Lower extremity edema:Negative      Assessment: Status post arthroscopic partial medial meniscectomy of right knee    Status post arthroscopic partial lateral meniscectomy of right knee        Plan:  Doing well status post above.  Activities as tolerated.  I will see him back as needed        "

## 2025-03-28 RX ORDER — MELOXICAM 15 MG/1
15 TABLET ORAL DAILY
Qty: 30 TABLET | Refills: 2 | Status: SHIPPED | OUTPATIENT
Start: 2025-03-28

## (undated) DEVICE — GOWN POLY REINF X-LONG 2XL

## (undated) DEVICE — PAD PREP CUFFED NS 24X48IN

## (undated) DEVICE — BLADE SHAVER LANZA 4.2X13CM

## (undated) DEVICE — PEROXIDE HYDROGEN 3% 16OZ

## (undated) DEVICE — DRAPE FULL SHEET 70X100IN

## (undated) DEVICE — APPLICATOR CHLORAPREP ORN 26ML

## (undated) DEVICE — GLOVE SIGNATURE ESSNTL LTX 8

## (undated) DEVICE — TUBE SET INFLOW/OUTFLOW

## (undated) DEVICE — GLOVE SENSICARE PI MICRO 6

## (undated) DEVICE — SOL NACL IRR 3000ML

## (undated) DEVICE — STRIP MEDI WND CLSR 1/2X4IN

## (undated) DEVICE — POSITIONER HEAD ADULT

## (undated) DEVICE — SUT 3-0 MONOCRYL PLUS PS-2

## (undated) DEVICE — GLOVE SIGNATURE MICRO LTX 8

## (undated) DEVICE — PADDING CAST SOFT-ROLL 6 X 4

## (undated) DEVICE — PACK SURGICAL KNEE SCOPE

## (undated) DEVICE — CUFF TOURNIQUET STER DIS 34

## (undated) DEVICE — GLOVE SENSICARE PI GRN 6.5

## (undated) DEVICE — SUPPORT ULNA NERVE PROTECTOR

## (undated) DEVICE — SPONGE COTTON TRAY 4X4IN

## (undated) DEVICE — NDL SAFETY 21G X 1 1/2 ECLPSE